# Patient Record
Sex: FEMALE | Race: WHITE | NOT HISPANIC OR LATINO | URBAN - METROPOLITAN AREA
[De-identification: names, ages, dates, MRNs, and addresses within clinical notes are randomized per-mention and may not be internally consistent; named-entity substitution may affect disease eponyms.]

---

## 2020-10-18 ENCOUNTER — ANESTHESIA EVENT (OUTPATIENT)
Dept: PERIOP | Facility: HOSPITAL | Age: 70
End: 2020-10-18

## 2020-10-18 ENCOUNTER — HOSPITAL ENCOUNTER (INPATIENT)
Facility: HOSPITAL | Age: 70
LOS: 3 days | Discharge: HOME HEALTH | End: 2020-10-21
Attending: STUDENT IN AN ORGANIZED HEALTH CARE EDUCATION/TRAINING PROGRAM | Admitting: INTERNAL MEDICINE

## 2020-10-18 ENCOUNTER — ANESTHESIA (OUTPATIENT)
Dept: PERIOP | Facility: HOSPITAL | Age: 70
End: 2020-10-18

## 2020-10-18 DIAGNOSIS — K56.609 SMALL BOWEL OBSTRUCTION (HCC): ICD-10-CM

## 2020-10-18 DIAGNOSIS — K43.3 PARASTOMAL HERNIA WITH OBSTRUCTION AND WITHOUT GANGRENE: Primary | ICD-10-CM

## 2020-10-18 PROBLEM — E11.9 TYPE 2 DIABETES MELLITUS WITHOUT COMPLICATION, WITHOUT LONG-TERM CURRENT USE OF INSULIN (HCC): Chronic | Status: ACTIVE | Noted: 2017-09-15

## 2020-10-18 PROBLEM — E11.9 DIABETES MELLITUS (HCC): Status: ACTIVE | Noted: 2017-09-15

## 2020-10-18 PROBLEM — K43.5 PARASTOMAL HERNIA: Status: ACTIVE | Noted: 2020-10-18

## 2020-10-18 PROBLEM — K21.9 GASTROESOPHAGEAL REFLUX DISEASE: Status: ACTIVE | Noted: 2018-05-01

## 2020-10-18 PROBLEM — J44.9 COPD (CHRONIC OBSTRUCTIVE PULMONARY DISEASE) (HCC): Chronic | Status: ACTIVE | Noted: 2020-10-18

## 2020-10-18 PROBLEM — K50.90 CROHN'S DISEASE (HCC): Chronic | Status: ACTIVE | Noted: 2020-10-18

## 2020-10-18 LAB
ABO GROUP BLD: NORMAL
ALBUMIN SERPL-MCNC: 4.3 G/DL (ref 3.5–5.2)
ALBUMIN/GLOB SERPL: 1.6 G/DL
ALP SERPL-CCNC: 113 U/L (ref 39–117)
ALT SERPL W P-5'-P-CCNC: 24 U/L (ref 1–33)
ANION GAP SERPL CALCULATED.3IONS-SCNC: 12 MMOL/L (ref 5–15)
APTT PPP: 24.9 SECONDS (ref 24–31)
AST SERPL-CCNC: 26 U/L (ref 1–32)
BASOPHILS # BLD AUTO: 0 10*3/MM3 (ref 0–0.2)
BASOPHILS NFR BLD AUTO: 0.4 % (ref 0–1.5)
BILIRUB SERPL-MCNC: 0.5 MG/DL (ref 0–1.2)
BLD GP AB SCN SERPL QL: NEGATIVE
BUN SERPL-MCNC: 12 MG/DL (ref 8–23)
BUN/CREAT SERPL: 13.8 (ref 7–25)
CALCIUM SPEC-SCNC: 9.5 MG/DL (ref 8.6–10.5)
CHLORIDE SERPL-SCNC: 102 MMOL/L (ref 98–107)
CO2 SERPL-SCNC: 26 MMOL/L (ref 22–29)
CREAT SERPL-MCNC: 0.87 MG/DL (ref 0.57–1)
DEPRECATED RDW RBC AUTO: 44.6 FL (ref 37–54)
EOSINOPHIL # BLD AUTO: 0 10*3/MM3 (ref 0–0.4)
EOSINOPHIL NFR BLD AUTO: 0.2 % (ref 0.3–6.2)
ERYTHROCYTE [DISTWIDTH] IN BLOOD BY AUTOMATED COUNT: 14.2 % (ref 12.3–15.4)
GFR SERPL CREATININE-BSD FRML MDRD: 65 ML/MIN/1.73
GLOBULIN UR ELPH-MCNC: 2.7 GM/DL
GLUCOSE BLDC GLUCOMTR-MCNC: 119 MG/DL (ref 70–105)
GLUCOSE BLDC GLUCOMTR-MCNC: 132 MG/DL (ref 70–105)
GLUCOSE BLDC GLUCOMTR-MCNC: 136 MG/DL (ref 70–105)
GLUCOSE BLDC GLUCOMTR-MCNC: 149 MG/DL (ref 70–105)
GLUCOSE BLDC GLUCOMTR-MCNC: 157 MG/DL (ref 70–105)
GLUCOSE SERPL-MCNC: 140 MG/DL (ref 65–99)
HCT VFR BLD AUTO: 42.7 % (ref 34–46.6)
HGB BLD-MCNC: 14.2 G/DL (ref 12–15.9)
INR PPP: 1 (ref 0.93–1.1)
LYMPHOCYTES # BLD AUTO: 1.1 10*3/MM3 (ref 0.7–3.1)
LYMPHOCYTES NFR BLD AUTO: 12.8 % (ref 19.6–45.3)
MCH RBC QN AUTO: 30.1 PG (ref 26.6–33)
MCHC RBC AUTO-ENTMCNC: 33.2 G/DL (ref 31.5–35.7)
MCV RBC AUTO: 90.6 FL (ref 79–97)
MONOCYTES # BLD AUTO: 0.8 10*3/MM3 (ref 0.1–0.9)
MONOCYTES NFR BLD AUTO: 9.1 % (ref 5–12)
NEUTROPHILS NFR BLD AUTO: 6.7 10*3/MM3 (ref 1.7–7)
NEUTROPHILS NFR BLD AUTO: 77.5 % (ref 42.7–76)
NRBC BLD AUTO-RTO: 0 /100 WBC (ref 0–0.2)
PLATELET # BLD AUTO: 220 10*3/MM3 (ref 140–450)
PMV BLD AUTO: 9.2 FL (ref 6–12)
POTASSIUM SERPL-SCNC: 4.1 MMOL/L (ref 3.5–5.2)
PROT SERPL-MCNC: 7 G/DL (ref 6–8.5)
PROTHROMBIN TIME: 11 SECONDS (ref 9.6–11.7)
RBC # BLD AUTO: 4.71 10*6/MM3 (ref 3.77–5.28)
RH BLD: POSITIVE
SARS-COV-2 RNA PNL SPEC NAA+PROBE: NOT DETECTED
SODIUM SERPL-SCNC: 140 MMOL/L (ref 136–145)
T&S EXPIRATION DATE: NORMAL
WBC # BLD AUTO: 8.7 10*3/MM3 (ref 3.4–10.8)

## 2020-10-18 PROCEDURE — 25010000002 MORPHINE PER 10 MG: Performed by: NURSE PRACTITIONER

## 2020-10-18 PROCEDURE — 85025 COMPLETE CBC W/AUTO DIFF WBC: CPT | Performed by: NURSE PRACTITIONER

## 2020-10-18 PROCEDURE — 0DN80ZZ RELEASE SMALL INTESTINE, OPEN APPROACH: ICD-10-PCS | Performed by: SURGERY

## 2020-10-18 PROCEDURE — 80053 COMPREHEN METABOLIC PANEL: CPT | Performed by: NURSE PRACTITIONER

## 2020-10-18 PROCEDURE — 25010000002 CEFAZOLIN PER 500 MG: Performed by: ANESTHESIOLOGY

## 2020-10-18 PROCEDURE — 25010000002 NEOSTIGMINE 10 MG/10ML SOLUTION: Performed by: ANESTHESIOLOGY

## 2020-10-18 PROCEDURE — 25010000002 KETOROLAC TROMETHAMINE PER 15 MG: Performed by: ANESTHESIOLOGY

## 2020-10-18 PROCEDURE — 25010000002 MORPHINE PER 10 MG: Performed by: SURGERY

## 2020-10-18 PROCEDURE — 0DB80ZZ EXCISION OF SMALL INTESTINE, OPEN APPROACH: ICD-10-PCS | Performed by: SURGERY

## 2020-10-18 PROCEDURE — 25010000002 FENTANYL CITRATE (PF) 100 MCG/2ML SOLUTION: Performed by: ANESTHESIOLOGY

## 2020-10-18 PROCEDURE — 86900 BLOOD TYPING SEROLOGIC ABO: CPT | Performed by: INTERNAL MEDICINE

## 2020-10-18 PROCEDURE — 85730 THROMBOPLASTIN TIME PARTIAL: CPT | Performed by: INTERNAL MEDICINE

## 2020-10-18 PROCEDURE — 25010000002 ONDANSETRON PER 1 MG: Performed by: NURSE PRACTITIONER

## 2020-10-18 PROCEDURE — 25010000002 HYDROMORPHONE PER 4 MG: Performed by: ANESTHESIOLOGY

## 2020-10-18 PROCEDURE — 86900 BLOOD TYPING SEROLOGIC ABO: CPT

## 2020-10-18 PROCEDURE — 0WQF0ZZ REPAIR ABDOMINAL WALL, OPEN APPROACH: ICD-10-PCS | Performed by: SURGERY

## 2020-10-18 PROCEDURE — 86850 RBC ANTIBODY SCREEN: CPT | Performed by: INTERNAL MEDICINE

## 2020-10-18 PROCEDURE — 25010000002 MORPHINE PER 10 MG: Performed by: ANESTHESIOLOGY

## 2020-10-18 PROCEDURE — 25010000002 MORPHINE PER 10 MG: Performed by: INTERNAL MEDICINE

## 2020-10-18 PROCEDURE — 82962 GLUCOSE BLOOD TEST: CPT

## 2020-10-18 PROCEDURE — 87635 SARS-COV-2 COVID-19 AMP PRB: CPT | Performed by: SURGERY

## 2020-10-18 PROCEDURE — 25010000002 ONDANSETRON PER 1 MG: Performed by: ANESTHESIOLOGY

## 2020-10-18 PROCEDURE — 25010000002 HYDRALAZINE PER 20 MG: Performed by: ANESTHESIOLOGY

## 2020-10-18 PROCEDURE — 25010000002 MIDAZOLAM PER 1 MG: Performed by: ANESTHESIOLOGY

## 2020-10-18 PROCEDURE — 44314 REVISION OF ILEOSTOMY: CPT | Performed by: SURGERY

## 2020-10-18 PROCEDURE — 86901 BLOOD TYPING SEROLOGIC RH(D): CPT

## 2020-10-18 PROCEDURE — 88304 TISSUE EXAM BY PATHOLOGIST: CPT | Performed by: SURGERY

## 2020-10-18 PROCEDURE — 99223 1ST HOSP IP/OBS HIGH 75: CPT | Performed by: SURGERY

## 2020-10-18 PROCEDURE — 25010000002 DEXAMETHASONE PER 1 MG: Performed by: ANESTHESIOLOGY

## 2020-10-18 PROCEDURE — 25010000002 PROPOFOL 200 MG/20ML EMULSION: Performed by: ANESTHESIOLOGY

## 2020-10-18 PROCEDURE — 94799 UNLISTED PULMONARY SVC/PX: CPT

## 2020-10-18 PROCEDURE — 85610 PROTHROMBIN TIME: CPT | Performed by: INTERNAL MEDICINE

## 2020-10-18 PROCEDURE — 86901 BLOOD TYPING SEROLOGIC RH(D): CPT | Performed by: INTERNAL MEDICINE

## 2020-10-18 PROCEDURE — 99222 1ST HOSP IP/OBS MODERATE 55: CPT | Performed by: INTERNAL MEDICINE

## 2020-10-18 DEVICE — PROXIMATE RELOADABLE LINEAR CUTTER WITH SAFETY LOCK-OUT, 75MM
Type: IMPLANTABLE DEVICE | Status: FUNCTIONAL
Brand: PROXIMATE

## 2020-10-18 RX ORDER — ACETAMINOPHEN 650 MG/1
650 SUPPOSITORY RECTAL EVERY 4 HOURS PRN
Status: DISCONTINUED | OUTPATIENT
Start: 2020-10-18 | End: 2020-10-21 | Stop reason: HOSPADM

## 2020-10-18 RX ORDER — ACETAMINOPHEN 650 MG/1
650 SUPPOSITORY RECTAL ONCE AS NEEDED
Status: DISCONTINUED | OUTPATIENT
Start: 2020-10-18 | End: 2020-10-18 | Stop reason: HOSPADM

## 2020-10-18 RX ORDER — ONDANSETRON 2 MG/ML
INJECTION INTRAMUSCULAR; INTRAVENOUS AS NEEDED
Status: DISCONTINUED | OUTPATIENT
Start: 2020-10-18 | End: 2020-10-18 | Stop reason: SURG

## 2020-10-18 RX ORDER — INSULIN LISPRO 100 [IU]/ML
0-9 INJECTION, SOLUTION INTRAVENOUS; SUBCUTANEOUS
Status: DISCONTINUED | OUTPATIENT
Start: 2020-10-18 | End: 2020-10-21 | Stop reason: HOSPADM

## 2020-10-18 RX ORDER — HYDROMORPHONE HCL 110MG/55ML
PATIENT CONTROLLED ANALGESIA SYRINGE INTRAVENOUS AS NEEDED
Status: DISCONTINUED | OUTPATIENT
Start: 2020-10-18 | End: 2020-10-18 | Stop reason: SURG

## 2020-10-18 RX ORDER — HYDRALAZINE HYDROCHLORIDE 20 MG/ML
INJECTION INTRAMUSCULAR; INTRAVENOUS AS NEEDED
Status: DISCONTINUED | OUTPATIENT
Start: 2020-10-18 | End: 2020-10-18 | Stop reason: SURG

## 2020-10-18 RX ORDER — ACETAMINOPHEN 160 MG/5ML
650 SOLUTION ORAL EVERY 4 HOURS PRN
Status: DISCONTINUED | OUTPATIENT
Start: 2020-10-18 | End: 2020-10-21 | Stop reason: HOSPADM

## 2020-10-18 RX ORDER — FENTANYL CITRATE 50 UG/ML
INJECTION, SOLUTION INTRAMUSCULAR; INTRAVENOUS AS NEEDED
Status: DISCONTINUED | OUTPATIENT
Start: 2020-10-18 | End: 2020-10-18 | Stop reason: SURG

## 2020-10-18 RX ORDER — DEXAMETHASONE SODIUM PHOSPHATE 4 MG/ML
INJECTION, SOLUTION INTRA-ARTICULAR; INTRALESIONAL; INTRAMUSCULAR; INTRAVENOUS; SOFT TISSUE AS NEEDED
Status: DISCONTINUED | OUTPATIENT
Start: 2020-10-18 | End: 2020-10-18 | Stop reason: SURG

## 2020-10-18 RX ORDER — ONDANSETRON 2 MG/ML
4 INJECTION INTRAMUSCULAR; INTRAVENOUS ONCE AS NEEDED
Status: DISCONTINUED | OUTPATIENT
Start: 2020-10-18 | End: 2020-10-18 | Stop reason: HOSPADM

## 2020-10-18 RX ORDER — NEOSTIGMINE METHYLSULFATE 1 MG/ML
INJECTION, SOLUTION INTRAVENOUS AS NEEDED
Status: DISCONTINUED | OUTPATIENT
Start: 2020-10-18 | End: 2020-10-18 | Stop reason: SURG

## 2020-10-18 RX ORDER — MEPERIDINE HYDROCHLORIDE 25 MG/ML
12.5 INJECTION INTRAMUSCULAR; INTRAVENOUS; SUBCUTANEOUS
Status: DISCONTINUED | OUTPATIENT
Start: 2020-10-18 | End: 2020-10-18 | Stop reason: HOSPADM

## 2020-10-18 RX ORDER — PROMETHAZINE HYDROCHLORIDE 25 MG/1
25 TABLET ORAL ONCE AS NEEDED
Status: DISCONTINUED | OUTPATIENT
Start: 2020-10-18 | End: 2020-10-18 | Stop reason: HOSPADM

## 2020-10-18 RX ORDER — ACETAMINOPHEN 325 MG/1
650 TABLET ORAL ONCE AS NEEDED
Status: DISCONTINUED | OUTPATIENT
Start: 2020-10-18 | End: 2020-10-18 | Stop reason: HOSPADM

## 2020-10-18 RX ORDER — PROPOFOL 10 MG/ML
INJECTION, EMULSION INTRAVENOUS AS NEEDED
Status: DISCONTINUED | OUTPATIENT
Start: 2020-10-18 | End: 2020-10-18 | Stop reason: SURG

## 2020-10-18 RX ORDER — NALOXONE HCL 0.4 MG/ML
0.4 VIAL (ML) INJECTION AS NEEDED
Status: DISCONTINUED | OUTPATIENT
Start: 2020-10-18 | End: 2020-10-18 | Stop reason: HOSPADM

## 2020-10-18 RX ORDER — CHOLECALCIFEROL (VITAMIN D3) 125 MCG
5 CAPSULE ORAL NIGHTLY PRN
Status: DISCONTINUED | OUTPATIENT
Start: 2020-10-18 | End: 2020-10-21 | Stop reason: HOSPADM

## 2020-10-18 RX ORDER — PROPOFOL 10 MG/ML
INJECTION, EMULSION INTRAVENOUS AS NEEDED
Status: DISCONTINUED | OUTPATIENT
Start: 2020-10-18 | End: 2020-10-18

## 2020-10-18 RX ORDER — MORPHINE SULFATE 4 MG/ML
4 INJECTION, SOLUTION INTRAMUSCULAR; INTRAVENOUS ONCE
Status: COMPLETED | OUTPATIENT
Start: 2020-10-18 | End: 2020-10-18

## 2020-10-18 RX ORDER — MORPHINE SULFATE 4 MG/ML
2 INJECTION, SOLUTION INTRAMUSCULAR; INTRAVENOUS
Status: DISCONTINUED | OUTPATIENT
Start: 2020-10-18 | End: 2020-10-18

## 2020-10-18 RX ORDER — LORAZEPAM 2 MG/ML
0.5 INJECTION INTRAMUSCULAR
Status: DISCONTINUED | OUTPATIENT
Start: 2020-10-18 | End: 2020-10-18 | Stop reason: HOSPADM

## 2020-10-18 RX ORDER — DEXTROSE MONOHYDRATE 25 G/50ML
25 INJECTION, SOLUTION INTRAVENOUS
Status: DISCONTINUED | OUTPATIENT
Start: 2020-10-18 | End: 2020-10-21 | Stop reason: HOSPADM

## 2020-10-18 RX ORDER — SODIUM CHLORIDE 0.9 % (FLUSH) 0.9 %
10 SYRINGE (ML) INJECTION EVERY 12 HOURS SCHEDULED
Status: DISCONTINUED | OUTPATIENT
Start: 2020-10-18 | End: 2020-10-19

## 2020-10-18 RX ORDER — ONDANSETRON 2 MG/ML
4 INJECTION INTRAMUSCULAR; INTRAVENOUS EVERY 6 HOURS PRN
Status: DISCONTINUED | OUTPATIENT
Start: 2020-10-18 | End: 2020-10-21 | Stop reason: HOSPADM

## 2020-10-18 RX ORDER — PROMETHAZINE HYDROCHLORIDE 25 MG/1
25 SUPPOSITORY RECTAL ONCE AS NEEDED
Status: DISCONTINUED | OUTPATIENT
Start: 2020-10-18 | End: 2020-10-18 | Stop reason: HOSPADM

## 2020-10-18 RX ORDER — KETOROLAC TROMETHAMINE 30 MG/ML
INJECTION, SOLUTION INTRAMUSCULAR; INTRAVENOUS AS NEEDED
Status: DISCONTINUED | OUTPATIENT
Start: 2020-10-18 | End: 2020-10-18 | Stop reason: SURG

## 2020-10-18 RX ORDER — ROCURONIUM BROMIDE 10 MG/ML
INJECTION, SOLUTION INTRAVENOUS AS NEEDED
Status: DISCONTINUED | OUTPATIENT
Start: 2020-10-18 | End: 2020-10-18 | Stop reason: SURG

## 2020-10-18 RX ORDER — LABETALOL HYDROCHLORIDE 5 MG/ML
INJECTION, SOLUTION INTRAVENOUS AS NEEDED
Status: DISCONTINUED | OUTPATIENT
Start: 2020-10-18 | End: 2020-10-18 | Stop reason: SURG

## 2020-10-18 RX ORDER — BISACODYL 5 MG/1
5 TABLET, DELAYED RELEASE ORAL DAILY PRN
Status: DISCONTINUED | OUTPATIENT
Start: 2020-10-18 | End: 2020-10-18

## 2020-10-18 RX ORDER — KETOROLAC TROMETHAMINE 15 MG/ML
15 INJECTION, SOLUTION INTRAMUSCULAR; INTRAVENOUS EVERY 6 HOURS PRN
Status: DISCONTINUED | OUTPATIENT
Start: 2020-10-18 | End: 2020-10-18 | Stop reason: HOSPADM

## 2020-10-18 RX ORDER — MORPHINE SULFATE 4 MG/ML
INJECTION, SOLUTION INTRAMUSCULAR; INTRAVENOUS AS NEEDED
Status: DISCONTINUED | OUTPATIENT
Start: 2020-10-18 | End: 2020-10-18 | Stop reason: SURG

## 2020-10-18 RX ORDER — SODIUM CHLORIDE, SODIUM LACTATE, POTASSIUM CHLORIDE, CALCIUM CHLORIDE 600; 310; 30; 20 MG/100ML; MG/100ML; MG/100ML; MG/100ML
100 INJECTION, SOLUTION INTRAVENOUS CONTINUOUS
Status: DISCONTINUED | OUTPATIENT
Start: 2020-10-18 | End: 2020-10-18

## 2020-10-18 RX ORDER — INSULIN LISPRO 100 [IU]/ML
0-9 INJECTION, SOLUTION INTRAVENOUS; SUBCUTANEOUS AS NEEDED
Status: DISCONTINUED | OUTPATIENT
Start: 2020-10-18 | End: 2020-10-21 | Stop reason: HOSPADM

## 2020-10-18 RX ORDER — ACETAMINOPHEN 325 MG/1
650 TABLET ORAL EVERY 4 HOURS PRN
Status: DISCONTINUED | OUTPATIENT
Start: 2020-10-18 | End: 2020-10-21 | Stop reason: HOSPADM

## 2020-10-18 RX ORDER — ONDANSETRON 4 MG/1
4 TABLET, FILM COATED ORAL EVERY 6 HOURS PRN
Status: DISCONTINUED | OUTPATIENT
Start: 2020-10-18 | End: 2020-10-21 | Stop reason: HOSPADM

## 2020-10-18 RX ORDER — HYDROCODONE BITARTRATE AND ACETAMINOPHEN 5; 325 MG/1; MG/1
1 TABLET ORAL ONCE AS NEEDED
Status: DISCONTINUED | OUTPATIENT
Start: 2020-10-18 | End: 2020-10-18 | Stop reason: HOSPADM

## 2020-10-18 RX ORDER — NICOTINE POLACRILEX 4 MG
15 LOZENGE BUCCAL
Status: DISCONTINUED | OUTPATIENT
Start: 2020-10-18 | End: 2020-10-21 | Stop reason: HOSPADM

## 2020-10-18 RX ORDER — MORPHINE SULFATE 4 MG/ML
2 INJECTION, SOLUTION INTRAMUSCULAR; INTRAVENOUS EVERY 4 HOURS PRN
Status: DISCONTINUED | OUTPATIENT
Start: 2020-10-18 | End: 2020-10-21 | Stop reason: HOSPADM

## 2020-10-18 RX ORDER — OXYCODONE HYDROCHLORIDE 5 MG/1
5 TABLET ORAL EVERY 4 HOURS PRN
Status: DISCONTINUED | OUTPATIENT
Start: 2020-10-18 | End: 2020-10-21 | Stop reason: HOSPADM

## 2020-10-18 RX ORDER — FLUMAZENIL 0.1 MG/ML
0.5 INJECTION INTRAVENOUS AS NEEDED
Status: DISCONTINUED | OUTPATIENT
Start: 2020-10-18 | End: 2020-10-18 | Stop reason: HOSPADM

## 2020-10-18 RX ORDER — MIDAZOLAM HYDROCHLORIDE 1 MG/ML
INJECTION INTRAMUSCULAR; INTRAVENOUS AS NEEDED
Status: DISCONTINUED | OUTPATIENT
Start: 2020-10-18 | End: 2020-10-18 | Stop reason: SURG

## 2020-10-18 RX ORDER — SODIUM CHLORIDE 0.9 % (FLUSH) 0.9 %
10 SYRINGE (ML) INJECTION AS NEEDED
Status: DISCONTINUED | OUTPATIENT
Start: 2020-10-18 | End: 2020-10-19

## 2020-10-18 RX ADMIN — MORPHINE SULFATE 4 MG: 4 INJECTION INTRAVENOUS at 06:21

## 2020-10-18 RX ADMIN — CEFAZOLIN SODIUM 2 G: 10 INJECTION, POWDER, FOR SOLUTION INTRAVENOUS at 13:32

## 2020-10-18 RX ADMIN — MIDAZOLAM 2 MG: 1 INJECTION INTRAMUSCULAR; INTRAVENOUS at 13:26

## 2020-10-18 RX ADMIN — SODIUM CHLORIDE, POTASSIUM CHLORIDE, SODIUM LACTATE AND CALCIUM CHLORIDE: 600; 310; 30; 20 INJECTION, SOLUTION INTRAVENOUS at 14:35

## 2020-10-18 RX ADMIN — LABETALOL 20 MG/4 ML (5 MG/ML) INTRAVENOUS SYRINGE 10 MG: at 14:20

## 2020-10-18 RX ADMIN — KETOROLAC TROMETHAMINE 30 MG: 30 INJECTION, SOLUTION INTRAMUSCULAR at 15:25

## 2020-10-18 RX ADMIN — SODIUM CHLORIDE, POTASSIUM CHLORIDE, SODIUM LACTATE AND CALCIUM CHLORIDE 100 ML/HR: 600; 310; 30; 20 INJECTION, SOLUTION INTRAVENOUS at 06:52

## 2020-10-18 RX ADMIN — NEOSTIGMINE METHYLSULFATE 4 MG: 1 INJECTION, SOLUTION INTRAVENOUS at 15:34

## 2020-10-18 RX ADMIN — PROPOFOL 180 MG: 10 INJECTION, EMULSION INTRAVENOUS at 13:32

## 2020-10-18 RX ADMIN — MORPHINE SULFATE 4 MG: 4 INJECTION INTRAVENOUS at 11:16

## 2020-10-18 RX ADMIN — SODIUM CHLORIDE, POTASSIUM CHLORIDE, SODIUM LACTATE AND CALCIUM CHLORIDE: 600; 310; 30; 20 INJECTION, SOLUTION INTRAVENOUS at 13:50

## 2020-10-18 RX ADMIN — HYDROMORPHONE HYDROCHLORIDE 0.4 MG: 2 INJECTION INTRAMUSCULAR; INTRAVENOUS; SUBCUTANEOUS at 15:12

## 2020-10-18 RX ADMIN — HYDROMORPHONE HYDROCHLORIDE 0.5 MG: 2 INJECTION INTRAMUSCULAR; INTRAVENOUS; SUBCUTANEOUS at 15:20

## 2020-10-18 RX ADMIN — MORPHINE SULFATE 4 MG: 4 INJECTION INTRAVENOUS at 13:52

## 2020-10-18 RX ADMIN — ROCURONIUM BROMIDE 10 MG: 10 INJECTION, SOLUTION INTRAVENOUS at 13:53

## 2020-10-18 RX ADMIN — ONDANSETRON 4 MG: 2 INJECTION INTRAMUSCULAR; INTRAVENOUS at 15:27

## 2020-10-18 RX ADMIN — HYDRALAZINE HYDROCHLORIDE 6 MG: 20 INJECTION INTRAMUSCULAR; INTRAVENOUS at 15:31

## 2020-10-18 RX ADMIN — ONDANSETRON 4 MG: 2 INJECTION INTRAMUSCULAR; INTRAVENOUS at 06:21

## 2020-10-18 RX ADMIN — FENTANYL CITRATE 100 MCG: 50 INJECTION, SOLUTION INTRAMUSCULAR; INTRAVENOUS at 13:26

## 2020-10-18 RX ADMIN — MORPHINE SULFATE 2 MG: 4 INJECTION INTRAVENOUS at 21:23

## 2020-10-18 RX ADMIN — Medication 10 ML: at 10:10

## 2020-10-18 RX ADMIN — GLYCOPYRROLATE 0.6 MG: 0.2 INJECTION, SOLUTION INTRAMUSCULAR; INTRAVITREAL at 15:34

## 2020-10-18 RX ADMIN — HYDROMORPHONE HYDROCHLORIDE 0.5 MG: 2 INJECTION INTRAMUSCULAR; INTRAVENOUS; SUBCUTANEOUS at 15:36

## 2020-10-18 RX ADMIN — HYDROMORPHONE HYDROCHLORIDE 0.6 MG: 2 INJECTION INTRAMUSCULAR; INTRAVENOUS; SUBCUTANEOUS at 15:27

## 2020-10-18 RX ADMIN — LABETALOL 20 MG/4 ML (5 MG/ML) INTRAVENOUS SYRINGE 10 MG: at 14:36

## 2020-10-18 RX ADMIN — DEXAMETHASONE SODIUM PHOSPHATE 4 MG: 4 INJECTION, SOLUTION INTRAMUSCULAR; INTRAVENOUS at 15:27

## 2020-10-18 RX ADMIN — ROCURONIUM BROMIDE 50 MG: 10 INJECTION, SOLUTION INTRAVENOUS at 13:32

## 2020-10-18 NOTE — PLAN OF CARE
Goal Outcome Evaluation:    Patient admitted for partial small bowel obstruction. She has complained of pain and nausea, treated per MAR. General surgery consult called, Dr. Genao to see her. Patient has had ileostomy since 2012, the area around the ileostomy is protruding significantly, patient states this started yesterday.

## 2020-10-18 NOTE — CONSULTS
Inpatient General Surgery Consult  Consult performed by: Kenyon Genao MD  Consult ordered by: Anthony Levine APRN  Reason for consult: parastomal hernia          General Surgery Consult Note      Subjective     70-year-old lady with a history of Crohn's disease who underwent colon resection in 2012 with end ileostomy and developed a parastomal hernia which was revised about 2 years ago who presented to an outside emergency department with a chief complaint of worsening abdominal pain.  She says the pain is around her stoma.  It is sharp and crampy and intermittent.  It is associated with nausea and vomiting.  Her ileostomy output basically stopped after yesterday evening.  She says that she has had a small parastomal hernia that got quite a bit larger yesterday has been more firm than normal.  At the outside hospital CT scan demonstrated a parastomal hernia with evidence of bowel obstruction.  She was then transferred here and I was asked to see her this morning.    History  Past Medical History:   Diagnosis Date   • Crohn's disease (CMS/HCC)    • Diabetes mellitus (CMS/HCC)    • GERD (gastroesophageal reflux disease)    • Hyperlipidemia    • Hypertension      Past Surgical History:   Procedure Laterality Date   • CHOLECYSTECTOMY     • COLON RESECTION WITH ILEOSTOMY  2012   • HERNIA REPAIR     • TONSILLECTOMY     • TUBAL ABDOMINAL LIGATION       Family History   Problem Relation Age of Onset   • Cancer Mother    • Cancer Father    • Diabetes Father    • Cancer Brother      Social History     Tobacco Use   • Smoking status: Former Smoker   • Smokeless tobacco: Never Used   Substance Use Topics   • Alcohol use: Not Currently   • Drug use: Never     No medications prior to admission.     Allergies:  Vancomycin and Sulfa antibiotics    Review of Systems   Constitutional: Positive for chills. Negative for fever.   HENT: Negative for sore throat and trouble swallowing.    Eyes: Negative for blurred vision and  double vision.   Respiratory: Negative for cough and shortness of breath.    Cardiovascular: Negative for chest pain and leg swelling.   Gastrointestinal: Positive for abdominal pain, constipation, nausea and vomiting. Negative for abdominal distention, blood in stool and diarrhea.   Genitourinary: Negative for dysuria and hematuria.   Skin: Negative for rash and bruise.   Neurological: Negative for dizziness and confusion.   Psychiatric/Behavioral: Negative for agitation and depressed mood.        Objective     Vital Signs  Temp:  [98 °F (36.7 °C)-98.8 °F (37.1 °C)] 98.3 °F (36.8 °C)  Heart Rate:  [69-96] 96  Resp:  [15-22] 15  BP: (163-167)/(78-90) 167/90    Physical Exam  Constitutional:       Appearance: She is well-developed.      Comments: Uncomfortable   HENT:      Head: Normocephalic and atraumatic.   Eyes:      General: No scleral icterus.     Conjunctiva/sclera: Conjunctivae normal.   Neck:      Musculoskeletal: No muscular tenderness.      Trachea: No tracheal deviation.   Cardiovascular:      Rate and Rhythm: Normal rate.      Pulses: Normal pulses.   Pulmonary:      Effort: Pulmonary effort is normal. No respiratory distress.   Abdominal:      Comments: Healed midline incision.  Right lower quadrant ileostomy with large parastomal hernia that is firm it is not reducible.  The ileostomy is viable.   Musculoskeletal:         General: No swelling or tenderness.   Lymphadenopathy:      Cervical: No cervical adenopathy.   Skin:     General: Skin is warm and dry.   Neurological:      General: No focal deficit present.      Mental Status: She is alert. Mental status is at baseline.   Psychiatric:         Mood and Affect: Mood normal.         Behavior: Behavior normal.         Results Review:  Lab Results (last 24 hours)     Procedure Component Value Units Date/Time    POC Glucose Once [[id number]]  (Abnormal) Collected: 10/18/20 1121    Specimen: Blood Updated: 10/18/20 1125     Glucose 119 mg/dL      Comment:  Serial Number: [id number]:  [id number]       POC Glucose Once [[id number]]  (Abnormal) Collected: 10/18/20 0747    Specimen: Blood Updated: 10/18/20 0748     Glucose 132 mg/dL      Comment: Serial Number: [id number]:  [id number]       Comprehensive Metabolic Panel [[id number]]  (Abnormal) Collected: 10/18/20 0711    Specimen: Blood Updated: 10/18/20 0743     Glucose 140 mg/dL      BUN 12 mg/dL      Creatinine 0.87 mg/dL      Sodium 140 mmol/L      Potassium 4.1 mmol/L      Chloride 102 mmol/L      CO2 26.0 mmol/L      Calcium 9.5 mg/dL      Total Protein 7.0 g/dL      Albumin 4.30 g/dL      ALT (SGPT) 24 U/L      AST (SGOT) 26 U/L      Alkaline Phosphatase 113 U/L      Total Bilirubin 0.5 mg/dL      eGFR Non African Amer 65 mL/min/1.73      Globulin 2.7 gm/dL      A/G Ratio 1.6 g/dL      BUN/Creatinine Ratio 13.8     Anion Gap 12.0 mmol/L     Narrative:      GFR Normal >60  Chronic Kidney Disease <60  Kidney Failure <15      aPTT [[id number]]  (Normal) Collected: 10/18/20 0711    Specimen: Blood Updated: 10/18/20 0734     PTT 24.9 seconds     Protime-INR [[id number]]  (Normal) Collected: 10/18/20 0711    Specimen: Blood Updated: 10/18/20 0734     Protime 11.0 Seconds      INR 1.00    CBC Auto Differential [[id number]]  (Abnormal) Collected: 10/18/20 0711    Specimen: Blood Updated: 10/18/20 0730     WBC 8.70 10*3/mm3      RBC 4.71 10*6/mm3      Hemoglobin 14.2 g/dL      Hematocrit 42.7 %      MCV 90.6 fL      MCH 30.1 pg      MCHC 33.2 g/dL      RDW 14.2 %      RDW-SD 44.6 fl      MPV 9.2 fL      Platelets 220 10*3/mm3      Neutrophil % 77.5 %      Lymphocyte % 12.8 %      Monocyte % 9.1 %      Eosinophil % 0.2 %      Basophil % 0.4 %      Neutrophils, Absolute 6.70 10*3/mm3      Lymphocytes, Absolute 1.10 10*3/mm3      Monocytes, Absolute 0.80 10*3/mm3      Eosinophils, Absolute 0.00 10*3/mm3      Basophils, Absolute 0.00 10*3/mm3      nRBC 0.0 /100 WBC         Imaging Results (Last 24 Hours)     ** No  results found for the last 24 hours. **          I reviewed the patient's other test results and agree with the interpretation  I reviewed the patient's new imaging results and agree with the interpretation.    Assessment/Plan     Principal Problem:    Small bowel obstruction (CMS/HCC)  Active Problems:    Diabetes mellitus (CMS/HCC)    Gastroesophageal reflux disease    Parastomal hernia    COPD (chronic obstructive pulmonary disease) (CMS/HCC)    Crohn's disease (CMS/HCC)      Parastomal hernia with incarceration and bowel obstruction.  I counseled her about the treatment options which are going to include exploratory laparotomy with reduction of the parastomal hernia and either revision of the parastomal hernia versus moving the ileostomy.  The general risks of surgery have been discussed including midline issues like recurrent hernia bleeding infection incidental injury to the small bowel bowel resection and recurrent parastomal hernia in the future.  After our discussion she does understand the risks of the procedure and is willing to proceed.    We will go ahead and place nasogastric tube for nausea and vomiting.  Consent for exploratory laparotomy with parastomal hernia repair.  We will be moving towards the operating room shortly    This note was created using Dragon Voice Recognition software.    Kenyon Genao MD  10/18/20  12:29 EDT

## 2020-10-18 NOTE — ANESTHESIA PREPROCEDURE EVALUATION
Anesthesia Evaluation     Patient summary reviewed and Nursing notes reviewed   NPO Solid Status: > 6 hours  NPO Liquid Status: > 6 hours           Airway   Mallampati: II  TM distance: >3 FB  Neck ROM: full  No difficulty expected  Dental - normal exam     Pulmonary - normal exam    breath sounds clear to auscultation  (+) COPD,   Cardiovascular - normal exam    ECG reviewed  Rhythm: regular  Rate: normal    (+) hypertension, hyperlipidemia,       Neuro/Psych- negative ROS  GI/Hepatic/Renal/Endo    (+)  GERD,  diabetes mellitus,     Musculoskeletal (-) negative ROS    Abdominal  - normal exam    Abdomen: soft.  Bowel sounds: normal.   Substance History - negative use     OB/GYN negative ob/gyn ROS         Other - negative ROS                       Anesthesia Plan    ASA 3 - emergent     general     intravenous induction     Anesthetic plan, all risks, benefits, and alternatives have been provided, discussed and informed consent has been obtained with: patient.  Use of blood products discussed with patient .

## 2020-10-18 NOTE — H&P
[facility] Medicine Services      Patient Name: Leonor Macias  : 1950  MRN: [id number]  Primary Care Physician: Emmanuel Guzman MD  Date of admission: 10/18/2020    Patient Care Team:  Emmanuel Guzman MD as PCP - General (Family Medicine)          Subjective   History Present Illness     Chief Complaint: Small bowel obstruction      Ms. Macias is a 70 y.o. female with a history of ulcerative colitis/Crohn's disease, diabetes type 2, GERD, hyperlipidemia, hypertension, extensive abdominal surgeries presented to an outlying facility ([facility] in [facility]) for a sudden onset of acute abdominal pain, nausea, and vomiting.  Patient has an ileostomy and states that she has not had output since about 8 PM on 10/17/2020.  Patient states her pain is a 7 out of 10, intermittent, sharp, cramping, relieved with pain medication to a 3 out of 10.  Patient states she has had a ileostomy since  where she had a bowel resection with a an ileostomy placed.  Patient states she is also had multiple hernia repairs.  Vital signs: 141/61, temp 97.6, heart rate 80, respiratory rate 16, O2 saturation 96%  Patient denies fever, chills, cough, congestion, chest pain, or ill contacts.  Patient was transferred to [facility] for further management and evaluation.    Labs: Lactic acid 1.3, sodium 139, potassium 3.6, BUN 18, creatinine 1.21, glucose 114, ALT 26, AST 38, alkaline phosphatase 127, WBC 8.0, Hgb 15.1, HCT 46.0, platelets 242.    CT scan abdomen pelvis with IV contrast: Mild distal partial small bowel obstruction with transition site seen with enlarged parastomal hernia along the right ileostomy.,  Mild ascites, small cystocele.    Obtain CBC, CMP, consult general surgery, cardiac monitor, n.p.o.,    Nursing staff to verify home medications.       Review of Systems   Constitution: Negative.   HENT: Negative.    Eyes: Negative.    Cardiovascular: Negative.    Respiratory:  Negative.    Endocrine: Negative.    Hematologic/Lymphatic: Negative.    Skin: Negative.    Musculoskeletal: Negative.    Gastrointestinal: Positive for abdominal pain, nausea and vomiting.   Genitourinary: Negative.    Neurological: Negative.    Psychiatric/Behavioral: Negative.    Allergic/Immunologic: Negative.    All other systems reviewed and are negative.          Personal History     Past Medical History:   Past Medical History:   Diagnosis Date   • Crohn's disease (CMS/HCC)    • Diabetes mellitus (CMS/HCC)    • GERD (gastroesophageal reflux disease)    • Hyperlipidemia    • Hypertension        Surgical History:      Past Surgical History:   Procedure Laterality Date   • CHOLECYSTECTOMY     • COLON RESECTION WITH ILEOSTOMY  2012   • HERNIA REPAIR     • TONSILLECTOMY     • TUBAL ABDOMINAL LIGATION             Family History: family history includes Cancer in her brother, father, and mother; Diabetes in her father. Otherwise pertinent FHx was reviewed and unremarkable.     Social History:  reports that she has quit smoking. She has never used smokeless tobacco. She reports previous alcohol use. She reports that she does not use drugs.      Medications:  Prior to Admission medications    Not on File       Allergies:    Allergies   Allergen Reactions   • Vancomycin Unknown - High Severity   • Sulfa Antibiotics Unknown - Low Severity       Objective   Objective     Vital Signs  Temp:  [98 °F (36.7 °C)] 98 °F (36.7 °C)  Heart Rate:  [69] 69  Resp:  [16] 16  BP: (163)/(79) 163/79  SpO2:  [94 %] 94 %  on   ;   Device (Oxygen Therapy): room air  Body mass index is 24.01 kg/m².    Physical Exam  Vitals signs reviewed.   Constitutional:       Appearance: She is ill-appearing.   HENT:      Head: Normocephalic.      Nose: Nose normal.      Mouth/Throat:      Mouth: Mucous membranes are dry.   Eyes:      Conjunctiva/sclera: Conjunctivae normal.   Neck:      Musculoskeletal: Normal range of motion.   Cardiovascular:       Rate and Rhythm: Normal rate and regular rhythm.   Pulmonary:      Effort: Pulmonary effort is normal.      Breath sounds: Normal breath sounds.   Abdominal:      General: There is distension.      Tenderness: There is abdominal tenderness. There is guarding.   Musculoskeletal: Normal range of motion.   Skin:     General: Skin is warm and dry.      Capillary Refill: Capillary refill takes less than 2 seconds.      Coloration: Skin is pale.   Neurological:      Mental Status: She is alert and oriented to person, place, and time.         Results Review:  I have personally reviewed most recent lab results, microbiology results and radiology images and interpretations and agree with findings,           Invalid input(s):  ALKPHOS  CrCl cannot be calculated (No successful lab value found.).  Brief Urine Lab Results     None          Microbiology Results (last 10 days)     ** No results found for the last 240 hours. **          ECG/EMG Results (most recent)     None                    No radiology results for the last 7 days      CrCl cannot be calculated (No successful lab value found.).    Assessment/Plan   Assessment/Plan       Active Hospital Problems    Diagnosis  POA   • **Small bowel obstruction (CMS/[facility]) [K56.609]  Yes   • Parastomal hernia [K43.5]  Yes   • COPD (chronic obstructive pulmonary disease) (CMS/[facility]) [J44.9]  Yes   • Crohn's disease (CMS/[facility]) [K50.90]  Yes   • Gastroesophageal reflux disease [K21.9]  Yes   • Diabetes mellitus (CMS/[facility]) [E11.9]  Yes      Resolved Hospital Problems   No resolved problems to display.     Bowel obstruction  -Keep n.p.o.  - IV fluids for hydration  -Antiemetics PRN  - Pain medication PRN  -General surgery consult    Hypertension  - Monitor BP  -Continue home medications once verified    COPD  -Not in acute exacerbation  - Monitor O2 saturations    GERD, chronic  - Continue PPI    Diabetes type 2  -Accu-Cheks before meals and at bedtime  -Sliding scale insulin as  needed      *Patient's home medications have not been verified at time of H&P, nursing staff to verify home medications          VTE Prophylaxis -   Mechanical Order History:      Ordered        10/18/20 0601  Place Sequential Compression Device  Once         10/18/20 0601  Maintain Sequential Compression Device  Continuous                 Pharmalogical Order History:     None          CODE STATUS:    Code Status and Medical Interventions:   Ordered at: 10/18/20 0601     Code Status:    CPR     Medical Interventions (Level of Support Prior to Arrest):    Full       This patient has been examined wearing appropriate Personal Protective Equipment . 10/18/20      I discussed the patient's findings and my recommendations with patient.        Electronically signed by RA Mays, 10/18/20, 6:09 AM EDT.  [facility] Hospitalist Team          Agree with above mentioned except my evaluation, assessment, plan and treatment supercedes that of [facility] or PA.  Ms. Macias is a 70 y.o. female with a history of ulcerative colitis/Crohn's disease, diabetes type 2, GERD, hyperlipidemia, hypertension, extensive abdominal surgeries presented to an outlying facility ([facility]) for a sudden onset of acute abdominal pain, nausea, and vomiting.  Patient has an ileostomy and states that she has not had output since about 8 PM on 10/17/2020.  Patient states her pain is a 7 out of 10, intermittent, sharp, cramping, relieved with pain medication to a 3 out of 10.  Patient states she has had a ileostomy since 2012 where she had a bowel resection with a an ileostomy placed.  Patient states she is also had multiple hernia repairs.  Vital signs: 141/61, temp 97.6, heart rate 80, respiratory rate 16, O2 saturation 96%  Patient denies fever, chills, cough, congestion, chest pain, or ill contacts.  Patient was transferred to [facility] for further management and evaluation.     Labs: Lactic acid 1.3,  sodium 139, potassium 3.6, BUN 18, creatinine 1.21, glucose 114, ALT 26, AST 38, alkaline phosphatase 127, WBC 8.0, Hgb 15.1, HCT 46.0, platelets 242.     CT scan abdomen pelvis with IV contrast: Mild distal partial small bowel obstruction with transition site seen with enlarged parastomal hernia along the right ileostomy.,  Mild ascites, small cystocele.     Obtain CBC, CMP, consult general surgery, cardiac monitor, n.p.o.,      History of subtotal colectomy with ileostomy placement 10 years ago for Crohn's disease.    ROS:  Constitution: Negative.   HENT: Negative.    Eyes: Negative.    Cardiovascular: Negative.    Respiratory: Negative.    Endocrine: Negative.    Hematologic/Lymphatic: Negative.    Skin: Negative.    Musculoskeletal: Negative.    Gastrointestinal: Positive for abdominal pain, nausea and vomiting.   Genitourinary: Negative.    Neurological: Negative.    Psychiatric/Behavioral: Negative.    Allergic/Immunologic: Negative.    All other systems reviewed and are negative.    Noted      Physical Exam   Constitutional: Patient appears well-developed and well-nourished and in no acute distress patient is status postop now.  Very pleasant female.    HEENT:   Head: Normocephalic and atraumatic.   Eyes:  Pupils are equal, round, and reactive to light. EOM are intact. Sclera are anicteric and non-injected.  Mouth and Throat: Patient has moist mucous membranes. Oropharynx is clear of any erythema or exudate.       Neck: Neck supple.  No thyromegaly present. No lymphadenopathy present. No  masses.     Cardiovascular: Inspection: No JVD present. Palpation: No parasternal heave. Pedal pulses +1 left and absent right.  No leg edema. Auscultation: Regular rate, regular rhythm, S1 normal and S2 normal. reveals no gallop and no friction rub. No Carotid bruit bilaterally.    Pulmonary/Chest: Inspection: No distress, no use of accessory muscles. Lungs are clear to auscultation bilaterally. No respiratory distress. No  wheezes. No rales.     Abdomen /Gastrointestinal: Inspection: no distension. Palpation: no masses, no organomegaly. Soft. There is no tenderness. Bowel sounds are absent.  NG tube present.  Ileostomy left lower quadrant present.    Musculoskeletal: Normal Muscle tone. Age appropriate, no deformities.    Neurological: Patient is alert and oriented to person, place, and time. Cranial nerves II-XII are grossly intact with no focal deficits. Sensori-motor exam is normal. No cerebellar signs.    Skin: Skin is warm. No rash noted. Nails show no clubbing.  No cyanosis or erythema. No bruising.    Emotional Behavior:   Appropriate   Debilities:  Age appropriate      Active Hospital Problems    Diagnosis  POA   • **Small bowel obstruction (CMS/HCC) [K56.609]  Yes     Priority: High   • Parastomal hernia with obstruction and without gangrene [K43.3]  Yes     Priority: Medium   • COPD (chronic obstructive pulmonary disease) (CMS/HCC) [J44.9]  Yes   • Crohn's disease (CMS/HCC) [K50.90]  Yes   • Type 2 diabetes mellitus without complication, without long-term current use of insulin (CMS/[facility]) [E11.9]  Yes      Resolved Hospital Problems   No resolved problems to display.     SBO:  Resolved post surgery.    Postop ileus now.      Parastomal hernia:  Resolved.      COPD:  Stable      Crohn's disease:  Stable with previous complications.      Diabetes mellitus 2:  Not on any medication at home.  Continue to monitor and sliding scale

## 2020-10-18 NOTE — ANESTHESIA POSTPROCEDURE EVALUATION
Patient: Leonor Macias    Procedure Summary     Date: 10/18/20 Room / Location: [facility] OR 08 / [facility] MAIN OR    Anesthesia Start: 1326 Anesthesia Stop: 1600    Procedure: LAPAROTOMY EXPLORATORY, VENTRAL HERNIA REPAIR, SMALL BOWEL RESECTION AND END ILEOSTOMY (N/A Abdomen) Diagnosis:       Small bowel obstruction (CMS/HCC)      Parastomal hernia with obstruction and without gangrene      (Small bowel obstruction (CMS/HCC) [K56.609])      (Parastomal hernia with obstruction and without gangrene [K43.3])    Surgeon: Kenyon Genao MD Provider: Michael Dickson MD    Anesthesia Type: general ASA Status: 3 - Emergent          Anesthesia Type: general    Vitals  Vitals Value Taken Time   /56 10/18/20 1603   Temp     Pulse 88 10/18/20 1603   Resp     SpO2 99 % 10/18/20 1603   Vitals shown include unvalidated device data.        Post Anesthesia Care and Evaluation    Patient location during evaluation: bedside  Patient participation: complete - patient participated  Level of consciousness: awake and alert  Pain score: 1  Pain management: adequate  Airway patency: patent  Anesthetic complications: No anesthetic complications  PONV Status: none  Cardiovascular status: acceptable  Respiratory status: acceptable  Hydration status: acceptable  Post Neuraxial Block status: Motor and sensory function returned to baseline

## 2020-10-18 NOTE — OP NOTE
Operative Report:    Patient Name:  Leonor Macias  YOB: 1950    Date of Surgery:  10/18/2020     Indications: 70-year-old lady who was transferred from outside hospital the chief complaint worsening parastomal pain nausea and vomiting.  On arrival she was found to have an incarcerated parastomal hernia.  I counseled her about the risks and benefits of exploratory laparotomy reduction of parastomal hernia and since she had already had previous parastomal hernia repair plan for moving the stoma.  After discussion about the risk and benefits of surgery she understood and was willing to proceed.    Pre-op Diagnosis:   Small bowel obstruction (CMS/HCC) [K56.609]  Parastomal hernia with obstruction and without gangrene [K43.3]       Post-Op Diagnosis Codes:     * Small bowel obstruction (CMS/HCC) [K56.609]     * Parastomal hernia with obstruction and without gangrene [K43.3]    Procedure/CPT® Codes:      Procedure(s):  Exploratory laparotomy with lysis of adhesions  Reduction of incarcerated parastomal hernia  Small bowel resection  End ileostomy  Ventral hernia repair    Staff:  Surgeon(s):  Kenyon Genao MD          Anesthesia: General    Estimated Blood Loss: 100ml    Implants:    Implant Name Type Inv. Item Serial No.  Lot No. LRB No. Used Action   STPLR LNR CUT PROX 75MM BENNIE TLC75 - [id number] Implant STPLR LNR CUT PROX 75MM BENNIE TLC75  [employer]  DIV [employer] J [employer] J [id number] N/A 1 Implanted       Specimen:          Specimens     ID Source Type Tests Collected By Collected At Frozen?      A Small Intestine Tissue · TISSUE PATHOLOGY EXAM   Kenyon Genao MD 10/18/20 [id number]      Description: SMALL BOWEL    This specimen was not marked as sent.              Findings: Large parastomal hernia incarcerated small bowel was viable    Complications: None    Description of Procedure: Patient was taken to the operating room placed on the operating table in the supine position under general  anesthesia.  Her abdomen was prepped and draped normal sterile fashion.  Timeout was performed identifying correct patient procedure site of operation.  I began the operation by making a midline laparotomy incision.  The fascia was incised the peritoneum was entered sharply there is no evidence of injury to underlying structures.  I then had to extend the midline incision inferiorly and lyse adhesions.  I then turned my attention to the parastomal hernia.  I performed a lysis of adhesions and was able to dissect the small bowel away from the previously placed parastomal hernia mesh.  By making a fascial incision I was able to reduce a large amount of proximal small bowel that was herniated through the parastomal defect.  It was dusky but over the course of 5 to 10 minutes pinked back up to normal.  I then excised the stoma exit site with the Bovie down to the level of the fascia.  During the process of detaching the small bowel from the previously placed mesh several partial-thickness injuries were unavoidable and made to the small bowel.  Therefore I selected a site proximal to this made a window in the mesentery and divided the bowel with a CON 75 staple load.  The mesentery was then divided using clamps and silk ties.  The specimen was then sent to pathology as small bowel.  I then performed a closure of the previous right lower quadrant hernia site by freeing the anterior fascia and then closing the posterior fascia including the previously placed what appeared to be Stratus mesh using interrupted figure-of-eight 0 Prolene sutures.  Through the skin defect I then closed the anterior fascia using interrupted 0 Prolene sutures.  I then selected a site on the left upper quadrant and excised bit of skin.  The anterior posterior fascia were incised and muscle was split and the small bowel was brought through this defect.  The abdomen was inspected there is no evidence of bleeding.  The abdomen and skin were irrigated  using Aricept and then saline.  We then changed gowns and gloves.  I closed the abdominal fascia using #1 looped PDS.  The skin was closed with staples.  The stoma site was closed with staples.  I then matured the small bowel ileostomy as an Endoloop using 3-0 Vicryl sutures.  At the end of the case the counts were correct the patient was transferred to recovery in good condition.      Kenyon Genao MD     Date: 10/18/2020  Time: 15:50 EDT    This note was created using Dragon Voice Recognition software.

## 2020-10-18 NOTE — PLAN OF CARE
Goal Outcome Evaluation:  Plan of Care Reviewed With: patient      Patient admitted today with small bowel obstruction. Taken to OR with Dr. Genao for exploratory lap and peristomal hernia repair. Patient arrived back on the unit around 1700. NG to intermittent low wall suction. Will continue to monitor.

## 2020-10-19 LAB
ALBUMIN SERPL-MCNC: 3.4 G/DL (ref 3.5–5.2)
ALBUMIN/GLOB SERPL: 1.4 G/DL
ALP SERPL-CCNC: 89 U/L (ref 39–117)
ALT SERPL W P-5'-P-CCNC: 30 U/L (ref 1–33)
ANION GAP SERPL CALCULATED.3IONS-SCNC: 11 MMOL/L (ref 5–15)
AST SERPL-CCNC: 33 U/L (ref 1–32)
BACTERIA UR QL AUTO: ABNORMAL /HPF
BASOPHILS # BLD AUTO: 0 10*3/MM3 (ref 0–0.2)
BASOPHILS NFR BLD AUTO: 0.4 % (ref 0–1.5)
BILIRUB SERPL-MCNC: 0.7 MG/DL (ref 0–1.2)
BILIRUB UR QL STRIP: NEGATIVE
BUN SERPL-MCNC: 11 MG/DL (ref 8–23)
BUN/CREAT SERPL: 15.1 (ref 7–25)
CALCIUM SPEC-SCNC: 9.2 MG/DL (ref 8.6–10.5)
CHLORIDE SERPL-SCNC: 108 MMOL/L (ref 98–107)
CLARITY UR: CLEAR
CO2 SERPL-SCNC: 24 MMOL/L (ref 22–29)
COLOR UR: YELLOW
CREAT SERPL-MCNC: 0.73 MG/DL (ref 0.57–1)
DEPRECATED RDW RBC AUTO: 45.1 FL (ref 37–54)
EOSINOPHIL # BLD AUTO: 0.1 10*3/MM3 (ref 0–0.4)
EOSINOPHIL NFR BLD AUTO: 0.9 % (ref 0.3–6.2)
ERYTHROCYTE [DISTWIDTH] IN BLOOD BY AUTOMATED COUNT: 14.3 % (ref 12.3–15.4)
GFR SERPL CREATININE-BSD FRML MDRD: 79 ML/MIN/1.73
GLOBULIN UR ELPH-MCNC: 2.5 GM/DL
GLUCOSE BLDC GLUCOMTR-MCNC: 106 MG/DL (ref 70–105)
GLUCOSE BLDC GLUCOMTR-MCNC: 115 MG/DL (ref 70–105)
GLUCOSE BLDC GLUCOMTR-MCNC: 124 MG/DL (ref 70–105)
GLUCOSE BLDC GLUCOMTR-MCNC: 129 MG/DL (ref 70–105)
GLUCOSE SERPL-MCNC: 147 MG/DL (ref 65–99)
GLUCOSE UR STRIP-MCNC: NEGATIVE MG/DL
HCT VFR BLD AUTO: 41.1 % (ref 34–46.6)
HGB BLD-MCNC: 13.4 G/DL (ref 12–15.9)
HGB UR QL STRIP.AUTO: ABNORMAL
HYALINE CASTS UR QL AUTO: ABNORMAL /LPF
KETONES UR QL STRIP: ABNORMAL
LEUKOCYTE ESTERASE UR QL STRIP.AUTO: NEGATIVE
LYMPHOCYTES # BLD AUTO: 1.1 10*3/MM3 (ref 0.7–3.1)
LYMPHOCYTES NFR BLD AUTO: 9.4 % (ref 19.6–45.3)
MCH RBC QN AUTO: 29.8 PG (ref 26.6–33)
MCHC RBC AUTO-ENTMCNC: 32.7 G/DL (ref 31.5–35.7)
MCV RBC AUTO: 90.9 FL (ref 79–97)
MONOCYTES # BLD AUTO: 1.4 10*3/MM3 (ref 0.1–0.9)
MONOCYTES NFR BLD AUTO: 12.2 % (ref 5–12)
NEUTROPHILS NFR BLD AUTO: 77.1 % (ref 42.7–76)
NEUTROPHILS NFR BLD AUTO: 8.8 10*3/MM3 (ref 1.7–7)
NITRITE UR QL STRIP: NEGATIVE
NRBC BLD AUTO-RTO: 0 /100 WBC (ref 0–0.2)
PH UR STRIP.AUTO: 6 [PH] (ref 5–8)
PLATELET # BLD AUTO: 211 10*3/MM3 (ref 140–450)
PMV BLD AUTO: 9 FL (ref 6–12)
POTASSIUM SERPL-SCNC: 4.1 MMOL/L (ref 3.5–5.2)
PROT SERPL-MCNC: 5.9 G/DL (ref 6–8.5)
PROT UR QL STRIP: ABNORMAL
RBC # BLD AUTO: 4.52 10*6/MM3 (ref 3.77–5.28)
RBC # UR: ABNORMAL /HPF
REF LAB TEST METHOD: ABNORMAL
SODIUM SERPL-SCNC: 143 MMOL/L (ref 136–145)
SP GR UR STRIP: 1.02 (ref 1–1.03)
SQUAMOUS #/AREA URNS HPF: ABNORMAL /HPF
UROBILINOGEN UR QL STRIP: ABNORMAL
WBC # BLD AUTO: 11.5 10*3/MM3 (ref 3.4–10.8)
WBC UR QL AUTO: ABNORMAL /HPF

## 2020-10-19 PROCEDURE — 87040 BLOOD CULTURE FOR BACTERIA: CPT | Performed by: INTERNAL MEDICINE

## 2020-10-19 PROCEDURE — 25010000002 MORPHINE PER 10 MG: Performed by: SURGERY

## 2020-10-19 PROCEDURE — 85025 COMPLETE CBC W/AUTO DIFF WBC: CPT | Performed by: INTERNAL MEDICINE

## 2020-10-19 PROCEDURE — 99233 SBSQ HOSP IP/OBS HIGH 50: CPT | Performed by: INTERNAL MEDICINE

## 2020-10-19 PROCEDURE — 82962 GLUCOSE BLOOD TEST: CPT

## 2020-10-19 PROCEDURE — 25010000002 ENOXAPARIN PER 10 MG: Performed by: SURGERY

## 2020-10-19 PROCEDURE — 81001 URINALYSIS AUTO W/SCOPE: CPT | Performed by: INTERNAL MEDICINE

## 2020-10-19 PROCEDURE — 25010000002 ONDANSETRON PER 1 MG: Performed by: SURGERY

## 2020-10-19 PROCEDURE — 99024 POSTOP FOLLOW-UP VISIT: CPT | Performed by: SURGERY

## 2020-10-19 PROCEDURE — 25010000002 PROMETHAZINE PER 50 MG: Performed by: NURSE PRACTITIONER

## 2020-10-19 PROCEDURE — 80053 COMPREHEN METABOLIC PANEL: CPT | Performed by: INTERNAL MEDICINE

## 2020-10-19 PROCEDURE — 25010000002 PIPERACILLIN SOD-TAZOBACTAM PER 1 G: Performed by: INTERNAL MEDICINE

## 2020-10-19 RX ORDER — ECHINACEA PURPUREA EXTRACT 125 MG
2 TABLET ORAL AS NEEDED
Status: DISCONTINUED | OUTPATIENT
Start: 2020-10-19 | End: 2020-10-19

## 2020-10-19 RX ORDER — LABETALOL HYDROCHLORIDE 5 MG/ML
20 INJECTION, SOLUTION INTRAVENOUS EVERY 4 HOURS PRN
Status: DISCONTINUED | OUTPATIENT
Start: 2020-10-19 | End: 2020-10-21 | Stop reason: HOSPADM

## 2020-10-19 RX ORDER — AZELASTINE 1 MG/ML
2 SPRAY, METERED NASAL DAILY
Status: DISCONTINUED | OUTPATIENT
Start: 2020-10-19 | End: 2020-10-21 | Stop reason: HOSPADM

## 2020-10-19 RX ORDER — SODIUM CHLORIDE 9 MG/ML
100 INJECTION, SOLUTION INTRAVENOUS CONTINUOUS
Status: DISCONTINUED | OUTPATIENT
Start: 2020-10-19 | End: 2020-10-19

## 2020-10-19 RX ADMIN — SODIUM CHLORIDE 12.5 MG: 900 INJECTION, SOLUTION INTRAVENOUS at 14:05

## 2020-10-19 RX ADMIN — Medication 10 ML: at 09:11

## 2020-10-19 RX ADMIN — SODIUM CHLORIDE 12.5 MG: 900 INJECTION, SOLUTION INTRAVENOUS at 05:22

## 2020-10-19 RX ADMIN — ENOXAPARIN SODIUM 40 MG: 40 INJECTION SUBCUTANEOUS at 17:09

## 2020-10-19 RX ADMIN — MORPHINE SULFATE 2 MG: 4 INJECTION INTRAVENOUS at 01:43

## 2020-10-19 RX ADMIN — LABETALOL 20 MG/4 ML (5 MG/ML) INTRAVENOUS SYRINGE 20 MG: at 18:00

## 2020-10-19 RX ADMIN — MORPHINE SULFATE 2 MG: 4 INJECTION INTRAVENOUS at 09:45

## 2020-10-19 RX ADMIN — MORPHINE SULFATE 2 MG: 4 INJECTION INTRAVENOUS at 18:00

## 2020-10-19 RX ADMIN — MORPHINE SULFATE 2 MG: 4 INJECTION INTRAVENOUS at 05:40

## 2020-10-19 RX ADMIN — ONDANSETRON 4 MG: 2 INJECTION INTRAMUSCULAR; INTRAVENOUS at 01:43

## 2020-10-19 RX ADMIN — ONDANSETRON 4 MG: 2 INJECTION INTRAMUSCULAR; INTRAVENOUS at 09:45

## 2020-10-19 RX ADMIN — SODIUM CHLORIDE 12.5 MG: 900 INJECTION, SOLUTION INTRAVENOUS at 21:28

## 2020-10-19 RX ADMIN — AZELASTINE HYDROCHLORIDE 2 SPRAY: 137 SPRAY, METERED NASAL at 17:09

## 2020-10-19 RX ADMIN — MORPHINE SULFATE 2 MG: 4 INJECTION INTRAVENOUS at 14:05

## 2020-10-19 RX ADMIN — MORPHINE SULFATE 2 MG: 4 INJECTION INTRAVENOUS at 22:06

## 2020-10-19 RX ADMIN — PIPERACILLIN AND TAZOBACTAM 3.38 G: 3; .375 INJECTION, POWDER, LYOPHILIZED, FOR SOLUTION INTRAVENOUS at 17:09

## 2020-10-19 NOTE — DISCHARGE PLACEMENT REQUEST
"Leonor Bellamy (70 y.o. Female)     Date of Birth Social Security Number Address Home Phone MRN    1950  [id number] [place] [address] [phone] [id number]    Hindu Marital Status          Unknown        Admission Date Admission Type Admitting Provider Attending Provider Department, Room/Bed    10/18/20 Urgent Jemma Bowling MD Kapadia, Shefali A, MD [facility] SURGICAL INPATIENT, 4124/1    Discharge Date Discharge Disposition Discharge Destination                       Attending Provider: Jemma Bowling MD    Allergies: Vancomycin, Sulfa Antibiotics    Isolation: None   Infection: None   Code Status: CPR    Ht: 177.8 cm (70\")   Wt: 76.2 kg (167 lb 15.9 oz)    Admission Cmt: None   Principal Problem: Small bowel obstruction (CMS/HCC) [K56.609]                 Active Insurance as of 10/18/2020     Primary Coverage     Payor Plan Insurance Group Employer/Plan Group    ANTHEM MEDICARE REPLACEMENT ANTHEM MEDICARE ADVANTAGE [id number]     Payor Plan Address Payor Plan Phone Number Payor Plan Fax Number Effective Dates    [address] [phone]  11/1/2019 - None Entered    [facility] [id number]       Subscriber Name Subscriber Birth Date Member ID       LEONOR BELLAMY 1950 [id number]           Secondary Coverage     Payor Plan Insurance Group Employer/Plan Group    [place]      Payor Plan Address Payor Plan Phone Number Payor Plan Fax Number Effective Dates    [address]   10/1/2020 - None Entered    [facility] [id number]       Subscriber Name Subscriber Birth Date Member ID       LEONOR BELLAMY 1950 [id number]                 Emergency Contacts      (Rel.) Home Phone Work Phone Mobile Phone    EDILBERTO BELLAMY (Spouse) [phone] -- [phone]    JOSESITOTORRI (Son) [phone] -- [phone]               History & Physical      Jesse Guerra MD at 10/18/20 0609                [facility] " Hospital Medicine Services      Patient Name: Leonor Macias  : 1950  MRN: [id number]  Primary Care Physician: Emmanuel Guzman MD  Date of admission: 10/18/2020    Patient Care Team:  Emmanuel Guzman MD as PCP - General (Family Medicine)          Subjective   History Present Illness     Chief Complaint: Small bowel obstruction      Ms. Macias is a 70 y.o. female with a history of ulcerative colitis/Crohn's disease, diabetes type 2, GERD, hyperlipidemia, hypertension, extensive abdominal surgeries presented to an outlying facility ([facility] in [facility]) for a sudden onset of acute abdominal pain, nausea, and vomiting.  Patient has an ileostomy and states that she has not had output since about 8 PM on 10/17/2020.  Patient states her pain is a 7 out of 10, intermittent, sharp, cramping, relieved with pain medication to a 3 out of 10.  Patient states she has had a ileostomy since  where she had a bowel resection with a an ileostomy placed.  Patient states she is also had multiple hernia repairs.  Vital signs: 141/61, temp 97.6, heart rate 80, respiratory rate 16, O2 saturation 96%  Patient denies fever, chills, cough, congestion, chest pain, or ill contacts.  Patient was [facility] for further management and evaluation.    Labs: Lactic acid 1.3, sodium 139, potassium 3.6, BUN 18, creatinine 1.21, glucose 114, ALT 26, AST 38, alkaline phosphatase 127, WBC 8.0, Hgb 15.1, HCT 46.0, platelets 242.    CT scan abdomen pelvis with IV contrast: Mild distal partial small bowel obstruction with transition site seen with enlarged parastomal hernia along the right ileostomy.,  Mild ascites, small cystocele.    Obtain CBC, CMP, consult general surgery, cardiac monitor, n.p.o.,    Nursing staff to verify home medications.       Review of Systems   Constitution: Negative.   HENT: Negative.    Eyes: Negative.    Cardiovascular: Negative.    Respiratory: Negative.    Endocrine:  Negative.    Hematologic/Lymphatic: Negative.    Skin: Negative.    Musculoskeletal: Negative.    Gastrointestinal: Positive for abdominal pain, nausea and vomiting.   Genitourinary: Negative.    Neurological: Negative.    Psychiatric/Behavioral: Negative.    Allergic/Immunologic: Negative.    All other systems reviewed and are negative.          Personal History     Past Medical History:   Past Medical History:   Diagnosis Date   • Crohn's disease (CMS/HCC)    • Diabetes mellitus (CMS/HCC)    • GERD (gastroesophageal reflux disease)    • Hyperlipidemia    • Hypertension        Surgical History:      Past Surgical History:   Procedure Laterality Date   • CHOLECYSTECTOMY     • COLON RESECTION WITH ILEOSTOMY  2012   • HERNIA REPAIR     • TONSILLECTOMY     • TUBAL ABDOMINAL LIGATION             Family History: family history includes Cancer in her brother, father, and mother; Diabetes in her father. Otherwise pertinent FHx was reviewed and unremarkable.     Social History:  reports that she has quit smoking. She has never used smokeless tobacco. She reports previous alcohol use. She reports that she does not use drugs.      Medications:  Prior to Admission medications    Not on File       Allergies:    Allergies   Allergen Reactions   • Vancomycin Unknown - High Severity   • Sulfa Antibiotics Unknown - Low Severity       Objective   Objective     Vital Signs  Temp:  [98 °F (36.7 °C)] 98 °F (36.7 °C)  Heart Rate:  [69] 69  Resp:  [16] 16  BP: (163)/(79) 163/79  SpO2:  [94 %] 94 %  on   ;   Device (Oxygen Therapy): room air  Body mass index is 24.01 kg/m².    Physical Exam  Vitals signs reviewed.   Constitutional:       Appearance: She is ill-appearing.   HENT:      Head: Normocephalic.      Nose: Nose normal.      Mouth/Throat:      Mouth: Mucous membranes are dry.   Eyes:      Conjunctiva/sclera: Conjunctivae normal.   Neck:      Musculoskeletal: Normal range of motion.   Cardiovascular:      Rate and Rhythm: Normal  rate and regular rhythm.   Pulmonary:      Effort: Pulmonary effort is normal.      Breath sounds: Normal breath sounds.   Abdominal:      General: There is distension.      Tenderness: There is abdominal tenderness. There is guarding.   Musculoskeletal: Normal range of motion.   Skin:     General: Skin is warm and dry.      Capillary Refill: Capillary refill takes less than 2 seconds.      Coloration: Skin is pale.   Neurological:      Mental Status: She is alert and oriented to person, place, and time.         Results Review:  I have personally reviewed most recent lab results, microbiology results and radiology images and interpretations and agree with findings,           Invalid input(s):  ALKPHOS  CrCl cannot be calculated (No successful lab value found.).  Brief Urine Lab Results     None          Microbiology Results (last 10 days)     ** No results found for the last 240 hours. **          ECG/EMG Results (most recent)     None                    No radiology results for the last 7 days      CrCl cannot be calculated (No successful lab value found.).    Assessment/Plan   Assessment/Plan       Active Hospital Problems    Diagnosis  POA   • **Small bowel obstruction (CMS/[facility]) [K56.609]  Yes   • Parastomal hernia [K43.5]  Yes   • COPD (chronic obstructive pulmonary disease) (CMS/HCC) [J44.9]  Yes   • Crohn's disease (CMS/HCC) [K50.90]  Yes   • Gastroesophageal reflux disease [K21.9]  Yes   • Diabetes mellitus (CMS/[facility]) [E11.9]  Yes      Resolved Hospital Problems   No resolved problems to display.     Bowel obstruction  -Keep n.p.o.  - IV fluids for hydration  -Antiemetics PRN  - Pain medication PRN  -General surgery consult    Hypertension  - Monitor BP  -Continue home medications once verified    COPD  -Not in acute exacerbation  - Monitor O2 saturations    GERD, chronic  - Continue PPI    Diabetes type 2  -Accu-Cheks before meals and at bedtime  -Sliding scale insulin as needed      *Patient's home  medications have not been verified at time of H&P, nursing staff to verify home medications          VTE Prophylaxis -   Mechanical Order History:      Ordered        10/18/20 0601  Place Sequential Compression Device  Once         10/18/20 0601  Maintain Sequential Compression Device  Continuous                 Pharmalogical Order History:     None          CODE STATUS:    Code Status and Medical Interventions:   Ordered at: 10/18/20 0601     Code Status:    CPR     Medical Interventions (Level of Support Prior to Arrest):    Full       This patient has been examined wearing appropriate Personal Protective Equipment . 10/18/20      I discussed the patient's findings and my recommendations with patient.        Electronically signed by RA Mays, 10/18/20, 6:09 AM EDT.  [facility] Hospitalist Team          Agree with above mentioned except my evaluation, assessment, plan and treatment supercedes that of [facility] or PA.  Ms. Macias is a 70 y.o. female with a history of ulcerative colitis/Crohn's disease, diabetes type 2, GERD, hyperlipidemia, hypertension, extensive abdominal surgeries presented to an outlying facility ([facility]) for a sudden onset of acute abdominal pain, nausea, and vomiting.  Patient has an ileostomy and states that she has not had output since about 8 PM on 10/17/2020.  Patient states her pain is a 7 out of 10, intermittent, sharp, cramping, relieved with pain medication to a 3 out of 10.  Patient states she has had a ileostomy since 2012 where she had a bowel resection with a an ileostomy placed.  Patient states she is also had multiple hernia repairs.  Vital signs: 141/61, temp 97.6, heart rate 80, respiratory rate 16, O2 saturation 96%  Patient denies fever, chills, cough, congestion, chest pain, or ill contacts.  Patient was transferred to [facility] for further management and evaluation.     Labs: Lactic acid 1.3, sodium 139, potassium 3.6, BUN  18, creatinine 1.21, glucose 114, ALT 26, AST 38, alkaline phosphatase 127, WBC 8.0, Hgb 15.1, HCT 46.0, platelets 242.     CT scan abdomen pelvis with IV contrast: Mild distal partial small bowel obstruction with transition site seen with enlarged parastomal hernia along the right ileostomy.,  Mild ascites, small cystocele.     Obtain CBC, CMP, consult general surgery, cardiac monitor, n.p.o.,      History of subtotal colectomy with ileostomy placement 10 years ago for Crohn's disease.    ROS:  Constitution: Negative.   HENT: Negative.    Eyes: Negative.    Cardiovascular: Negative.    Respiratory: Negative.    Endocrine: Negative.    Hematologic/Lymphatic: Negative.    Skin: Negative.    Musculoskeletal: Negative.    Gastrointestinal: Positive for abdominal pain, nausea and vomiting.   Genitourinary: Negative.    Neurological: Negative.    Psychiatric/Behavioral: Negative.    Allergic/Immunologic: Negative.    All other systems reviewed and are negative.    Noted      Physical Exam   Constitutional: Patient appears well-developed and well-nourished and in no acute distress patient is status postop now.  Very pleasant female.    HEENT:   Head: Normocephalic and atraumatic.   Eyes:  Pupils are equal, round, and reactive to light. EOM are intact. Sclera are anicteric and non-injected.  Mouth and Throat: Patient has moist mucous membranes. Oropharynx is clear of any erythema or exudate.       Neck: Neck supple.  No thyromegaly present. No lymphadenopathy present. No  masses.     Cardiovascular: Inspection: No JVD present. Palpation: No parasternal heave. Pedal pulses +1 left and absent right.  No leg edema. Auscultation: Regular rate, regular rhythm, S1 normal and S2 normal. reveals no gallop and no friction rub. No Carotid bruit bilaterally.    Pulmonary/Chest: Inspection: No distress, no use of accessory muscles. Lungs are clear to auscultation bilaterally. No respiratory distress. No wheezes. No rales.     Abdomen  /Gastrointestinal: Inspection: no distension. Palpation: no masses, no organomegaly. Soft. There is no tenderness. Bowel sounds are absent.  NG tube present.  Ileostomy left lower quadrant present.    Musculoskeletal: Normal Muscle tone. Age appropriate, no deformities.    Neurological: Patient is alert and oriented to person, place, and time. Cranial nerves II-XII are grossly intact with no focal deficits. Sensori-motor exam is normal. No cerebellar signs.    Skin: Skin is warm. No rash noted. Nails show no clubbing.  No cyanosis or erythema. No bruising.    Emotional Behavior:   Appropriate   Debilities:  Age appropriate      Active Hospital Problems    Diagnosis  POA   • **Small bowel obstruction (CMS/[facility]) [K56.609]  Yes     Priority: High   • Parastomal hernia with obstruction and without gangrene [K43.3]  Yes     Priority: Medium   • COPD (chronic obstructive pulmonary disease) (CMS/HCC) [J44.9]  Yes   • Crohn's disease (CMS/[facility]) [K50.90]  Yes   • Type 2 diabetes mellitus without complication, without long-term current use of insulin (CMS/[facility]) [E11.9]  Yes      Resolved Hospital Problems   No resolved problems to display.     SBO:  Resolved post surgery.    Postop ileus now.      Parastomal hernia:  Resolved.      COPD:  Stable      Crohn's disease:  Stable with previous complications.      Diabetes mellitus 2:  Not on any medication at home.  Continue to monitor and sliding scale            Electronically signed by Jesse Guerra MD at 10/18/20 1831       Referral Orders (last 24 hours) (24h ago, onward)     Start     Ordered    10/19/20 0000  Ambulatory Referral to Home Health     Question Answer Comment   Face to Face Visit Date: 10/19/2020    Follow-up provider for Plan of Care? I treated the patient in an acute care facility and will not continue treatment after discharge.    Follow-up provider: IKE BARILLAS    Reason/Clinical Findings s/p ileostomy    Describe mobility limitations that make leaving home  difficult: s/p ileostomy    Nursing/Therapeutic Services Requested Other eval and treat   Frequency: 1 Week 1        10/19/20 1141                Consult Orders (last 24 hours) (24h ago, onward)    None           Operative/Procedure Notes (last 72 hours) (Notes from 10/16/20 1141 through 10/19/20 1141)      Kenyon Genao MD at 10/18/20 1352          Operative Report:    Patient Name:  Leonor Macias  YOB: 1950    Date of Surgery:  10/18/2020     Indications: 70-year-old lady who was transferred from outside hospital the chief complaint worsening parastomal pain nausea and vomiting.  On arrival she was found to have an incarcerated parastomal hernia.  I counseled her about the risks and benefits of exploratory laparotomy reduction of parastomal hernia and since she had already had previous parastomal hernia repair plan for moving the stoma.  After discussion about the risk and benefits of surgery she understood and was willing to proceed.    Pre-op Diagnosis:   Small bowel obstruction (CMS/HCC) [K56.609]  Parastomal hernia with obstruction and without gangrene [K43.3]       Post-Op Diagnosis Codes:     * Small bowel obstruction (CMS/HCC) [K56.609]     * Parastomal hernia with obstruction and without gangrene [K43.3]    Procedure/CPT® Codes:      Procedure(s):  Exploratory laparotomy with lysis of adhesions  Reduction of incarcerated parastomal hernia  Small bowel resection  End ileostomy  Ventral hernia repair    Staff:  Surgeon(s):  Kenyon Genao MD          Anesthesia: General    Estimated Blood Loss: 100ml    Implants:    Implant Name Type Inv. Item Serial No.  Lot No. LRB No. Used Action   STPLR LNR CUT PROX 75MM BENNIE [id number] - [id number] Implant STPLR LNR CUT PROX 75MM BENNIE TLC75  [employer]  DIV OF J AND J [id number] N/A 1 Implanted       Specimen:          Specimens     ID Source Type Tests Collected By Collected At Frozen?      A Small Intestine Tissue · TISSUE PATHOLOGY  EXAM   Kenyon Genao MD 10/18/20 3162      Description: SMALL BOWEL    This specimen was not marked as sent.              Findings: Large parastomal hernia incarcerated small bowel was viable    Complications: None    Description of Procedure: Patient was taken to the operating room placed on the operating table in the supine position under general anesthesia.  Her abdomen was prepped and draped normal sterile fashion.  Timeout was performed identifying correct patient procedure site of operation.  I began the operation by making a midline laparotomy incision.  The fascia was incised the peritoneum was entered sharply there is no evidence of injury to underlying structures.  I then had to extend the midline incision inferiorly and lyse adhesions.  I then turned my attention to the parastomal hernia.  I performed a lysis of adhesions and was able to dissect the small bowel away from the previously placed parastomal hernia mesh.  By making a fascial incision I was able to reduce a large amount of proximal small bowel that was herniated through the parastomal defect.  It was dusky but over the course of 5 to 10 minutes pinked back up to normal.  I then excised the stoma exit site with the Bovie down to the level of the fascia.  During the process of detaching the small bowel from the previously placed mesh several partial-thickness injuries were unavoidable and made to the small bowel.  Therefore I selected a site proximal to this made a window in the mesentery and divided the bowel with a CON 75 staple load.  The mesentery was then divided using clamps and silk ties.  The specimen was then sent to pathology as small bowel.  I then performed a closure of the previous right lower quadrant hernia site by freeing the anterior fascia and then closing the posterior fascia including the previously placed what appeared to be Stratus mesh using interrupted figure-of-eight 0 Prolene sutures.  Through the skin defect I then  closed the anterior fascia using interrupted 0 Prolene sutures.  I then selected a site on the left upper quadrant and excised bit of skin.  The anterior posterior fascia were incised and muscle was split and the small bowel was brought through this defect.  The abdomen was inspected there is no evidence of bleeding.  The abdomen and skin were irrigated using Aricept and then saline.  We then changed gowns and gloves.  I closed the abdominal fascia using #1 looped PDS.  The skin was closed with staples.  The stoma site was closed with staples.  I then matured the small bowel ileostomy as an Endoloop using 3-0 Vicryl sutures.  At the end of the case the counts were correct the patient was transferred to recovery in good condition.      Kenyon Genao MD     Date: 10/18/2020  Time: 15:50 EDT    This note was created using Dragon Voice Recognition software.    Electronically signed by Kenyon Genao MD at 10/18/20 1556       Physician Progress Notes (last 24 hours) (Notes from 10/18/20 1142 through 10/19/20 1142)    No notes of this type exist for this encounter.            Consult Notes (last 24 hours) (Notes from 10/18/20 1142 through 10/19/20 1142)      Kenyon Genao MD at 10/18/20 1228      Consult Orders    1. Inpatient General Surgery Consult [[id number]] ordered by Anthony Levine APRN               Inpatient General Surgery Consult  Consult performed by: Kenyon Genao MD  Consult ordered by: Anthony Levine APRN  Reason for consult: parastomal hernia          General Surgery Consult Note      Subjective     70-year-old lady with a history of Crohn's disease who underwent colon resection in 2012 with end ileostomy and developed a parastomal hernia which was revised about 2 years ago who presented to an outside emergency department with a chief complaint of worsening abdominal pain.  She says the pain is around her stoma.  It is sharp and crampy and intermittent.  It is associated with nausea  and vomiting.  Her ileostomy output basically stopped after yesterday evening.  She says that she has had a small parastomal hernia that got quite a bit larger yesterday has been more firm than normal.  At the outside hospital CT scan demonstrated a parastomal hernia with evidence of bowel obstruction.  She was then transferred here and I was asked to see her this morning.    History  Past Medical History:   Diagnosis Date   • Crohn's disease (CMS/HCC)    • Diabetes mellitus (CMS/HCC)    • GERD (gastroesophageal reflux disease)    • Hyperlipidemia    • Hypertension      Past Surgical History:   Procedure Laterality Date   • CHOLECYSTECTOMY     • COLON RESECTION WITH ILEOSTOMY  2012   • HERNIA REPAIR     • TONSILLECTOMY     • TUBAL ABDOMINAL LIGATION       Family History   Problem Relation Age of Onset   • Cancer Mother    • Cancer Father    • Diabetes Father    • Cancer Brother      Social History     Tobacco Use   • Smoking status: Former Smoker   • Smokeless tobacco: Never Used   Substance Use Topics   • Alcohol use: Not Currently   • Drug use: Never     No medications prior to admission.     Allergies:  Vancomycin and Sulfa antibiotics    Review of Systems   Constitutional: Positive for chills. Negative for fever.   HENT: Negative for sore throat and trouble swallowing.    Eyes: Negative for blurred vision and double vision.   Respiratory: Negative for cough and shortness of breath.    Cardiovascular: Negative for chest pain and leg swelling.   Gastrointestinal: Positive for abdominal pain, constipation, nausea and vomiting. Negative for abdominal distention, blood in stool and diarrhea.   Genitourinary: Negative for dysuria and hematuria.   Skin: Negative for rash and bruise.   Neurological: Negative for dizziness and confusion.   Psychiatric/Behavioral: Negative for agitation and depressed mood.        Objective     Vital Signs  Temp:  [98 °F (36.7 °C)-98.8 °F (37.1 °C)] 98.3 °F (36.8 °C)  Heart Rate:  [69-96]  96  Resp:  [15-22] 15  BP: (163-167)/(78-90) 167/90    Physical Exam  Constitutional:       Appearance: She is well-developed.      Comments: Uncomfortable   HENT:      Head: Normocephalic and atraumatic.   Eyes:      General: No scleral icterus.     Conjunctiva/sclera: Conjunctivae normal.   Neck:      Musculoskeletal: No muscular tenderness.      Trachea: No tracheal deviation.   Cardiovascular:      Rate and Rhythm: Normal rate.      Pulses: Normal pulses.   Pulmonary:      Effort: Pulmonary effort is normal. No respiratory distress.   Abdominal:      Comments: Healed midline incision.  Right lower quadrant ileostomy with large parastomal hernia that is firm it is not reducible.  The ileostomy is viable.   Musculoskeletal:         General: No swelling or tenderness.   Lymphadenopathy:      Cervical: No cervical adenopathy.   Skin:     General: Skin is warm and dry.   Neurological:      General: No focal deficit present.      Mental Status: She is alert. Mental status is at baseline.   Psychiatric:         Mood and Affect: Mood normal.         Behavior: Behavior normal.         Results Review:  Lab Results (last 24 hours)     Procedure Component Value Units Date/Time    POC Glucose Once [[id number]]  (Abnormal) Collected: 10/18/20 1121    Specimen: Blood Updated: 10/18/20 1125     Glucose 119 mg/dL      Comment: Serial Number: [id number]:  [id number]       POC Glucose Once [[id number]]  (Abnormal) Collected: 10/18/20 0747    Specimen: Blood Updated: 10/18/20 0748     Glucose 132 mg/dL      Comment: Serial Number: [id number]:  [id number]       Comprehensive Metabolic Panel [[id number]]  (Abnormal) Collected: 10/18/20 0711    Specimen: Blood Updated: 10/18/20 0743     Glucose 140 mg/dL      BUN 12 mg/dL      Creatinine 0.87 mg/dL      Sodium 140 mmol/L      Potassium 4.1 mmol/L      Chloride 102 mmol/L      CO2 26.0 mmol/L      Calcium 9.5 mg/dL      Total Protein 7.0 g/dL      Albumin 4.30 g/dL      ALT  (SGPT) 24 U/L      AST (SGOT) 26 U/L      Alkaline Phosphatase 113 U/L      Total Bilirubin 0.5 mg/dL      eGFR Non African Amer 65 mL/min/1.73      Globulin 2.7 gm/dL      A/G Ratio 1.6 g/dL      BUN/Creatinine Ratio 13.8     Anion Gap 12.0 mmol/L     Narrative:      GFR Normal >60  Chronic Kidney Disease <60  Kidney Failure <15      aPTT [[id number]]  (Normal) Collected: 10/18/20 0711    Specimen: Blood Updated: 10/18/20 0734     PTT 24.9 seconds     Protime-INR [[id number]]  (Normal) Collected: 10/18/20 0711    Specimen: Blood Updated: 10/18/20 0734     Protime 11.0 Seconds      INR 1.00    CBC Auto Differential [[id number]]  (Abnormal) Collected: 10/18/20 0711    Specimen: Blood Updated: 10/18/20 0730     WBC 8.70 10*3/mm3      RBC 4.71 10*6/mm3      Hemoglobin 14.2 g/dL      Hematocrit 42.7 %      MCV 90.6 fL      MCH 30.1 pg      MCHC 33.2 g/dL      RDW 14.2 %      RDW-SD 44.6 fl      MPV 9.2 fL      Platelets 220 10*3/mm3      Neutrophil % 77.5 %      Lymphocyte % 12.8 %      Monocyte % 9.1 %      Eosinophil % 0.2 %      Basophil % 0.4 %      Neutrophils, Absolute 6.70 10*3/mm3      Lymphocytes, Absolute 1.10 10*3/mm3      Monocytes, Absolute 0.80 10*3/mm3      Eosinophils, Absolute 0.00 10*3/mm3      Basophils, Absolute 0.00 10*3/mm3      nRBC 0.0 /100 WBC         Imaging Results (Last 24 Hours)     ** No results found for the last 24 hours. **          I reviewed the patient's other test results and agree with the interpretation  I reviewed the patient's new imaging results and agree with the interpretation.    Assessment/Plan     Principal Problem:    Small bowel obstruction (CMS/HCC)  Active Problems:    Diabetes mellitus (CMS/HCC)    Gastroesophageal reflux disease    Parastomal hernia    COPD (chronic obstructive pulmonary disease) (CMS/HCC)    Crohn's disease (CMS/HCC)      Parastomal hernia with incarceration and bowel obstruction.  I counseled her about the treatment options which are going to include  exploratory laparotomy with reduction of the parastomal hernia and either revision of the parastomal hernia versus moving the ileostomy.  The general risks of surgery have been discussed including midline issues like recurrent hernia bleeding infection incidental injury to the small bowel bowel resection and recurrent parastomal hernia in the future.  After our discussion she does understand the risks of the procedure and is willing to proceed.    We will go ahead and place nasogastric tube for nausea and vomiting.  Consent for exploratory laparotomy with parastomal hernia repair.  We will be moving towards the operating room shortly    This note was created using Dragon Voice Recognition software.    Kenyon Genao MD  10/18/20  12:29 EDT      Electronically signed by Kenyon Genao MD at 10/18/20 8277

## 2020-10-19 NOTE — PLAN OF CARE
Goal Outcome Evaluation:  Plan of Care Reviewed With: patient      Patient still complaining of pain. Patient refuses to get out of bed. Patient still has a stuart cath. Pushed for patient to use IS. Started IV Zosyn. Will continue to monitor.

## 2020-10-19 NOTE — PLAN OF CARE
Goal Outcome Evaluation:    Patient complains of significant abdominal pain that worsens when moving, treating per MAR. She also complains of intermittent nausea, zofran not lasting long enough, got orders for phenergan. Only very small amount of sanguinous output in ileostomy so far. NGT in place with small amount of brown output.

## 2020-10-19 NOTE — DISCHARGE PLACEMENT REQUEST
"Leonor Macias (70 y.o. Female)     Date of Birth Social Security Number Address Home Phone MRN    1950  [id number] [place] [address] [phone] [id number]    Hinduism Marital Status          Unknown        Admission Date Admission Type Admitting Provider Attending Provider Department, Room/Bed    10/18/20 Urgent Jemma Bowling MD Kapadia, Shefali A, MD [facility] SURGICAL INPATIENT, 4124/1    Discharge Date Discharge Disposition Discharge Destination                       Attending Provider: Jemma Bowling MD    Allergies: Vancomycin, Sulfa Antibiotics    Isolation: None   Infection: None   Code Status: CPR    Ht: 177.8 cm (70\")   Wt: 76.2 kg (167 lb 15.9 oz)    Admission Cmt: None   Principal Problem: Small bowel obstruction (CMS/HCC) [K56.609]                 Active Insurance as of 10/18/2020     Primary Coverage     Payor Plan Insurance Group Employer/Plan Group    ANTHEM MEDICARE REPLACEMENT ANTHEM MEDICARE ADVANTAGE [id number]     Payor Plan Address Payor Plan Phone Number Payor Plan Fax Number Effective Dates    [address] [phone]  11/1/2019 - None Entered    [facility] [address]       Subscriber Name Subscriber Birth Date Member ID       LEONOR MACIAS 1950 [id number]           Secondary Coverage     Payor Plan Insurance Group Employer/Plan Group    [place]      Payor Plan Address Payor Plan Phone Number Payor Plan Fax Number Effective Dates    [address]   10/1/2020 - None Entered    [place] [address]       Subscriber Name Subscriber Birth Date Member ID       LEONOR MACIAS 1950 [id number]                 Emergency Contacts      (Rel.) Home Phone Work Phone Mobile Phone    EDILBERTO MACIAS (Spouse) [phone] -- [phone]    TORRI MACIAS (Son) [phone] -- [phone]            Emergency Contact Information     Name Relation Home Work Mobile    EDILBERTO MACIAS Spouse " [phone]    TORRI BELLAMY [phone]          Insurance Information                ANTHEM MEDICARE REPLACEMENT/ANTH MEDICARE ADVANTAGE Phone: [phone]    Subscriber: Leonor Bellamy Subscriber#: [id number]    Group#: [id number] Precert#:         [place] Phone:     Subscriber: Leonor Bellamy Subscriber#: [id number]    Group#:  Precert#:

## 2020-10-19 NOTE — PROGRESS NOTES
[facility] Medicine Services Daily Progress Note          Hospitalist Team  LOS 1 days      Patient Care Team:  Emmanuel Guzman MD as PCP - General (Family Medicine)  Kenyon Genao MD as Consulting Physician (General Surgery)    Patient Location: [facility]/1      Subjective   Subjective     Chief Complaint / Subjective  Small bowel obstruction      Brief Synopsis of Hospital Course/HPI  Ms. Macias is a 70 y.o. female with a history of ulcerative colitis/Crohn's disease, diabetes type 2, GERD, hyperlipidemia, hypertension, extensive abdominal surgeries presented to an outlying facility ([facility]) for a sudden onset of acute abdominal pain, nausea, and vomiting.  Patient has an ileostomy and states that she has not had output since about 8 PM on 10/17/2020.  Patient states her pain is a 7 out of 10, intermittent, sharp, cramping, relieved with pain medication to a 3 out of 10.  Patient states she has had a ileostomy since 2012 where she had a bowel resection with a an ileostomy placed.  Patient states she is also had multiple hernia repairs.  Vital signs: 141/61, temp 97.6, heart rate 80, respiratory rate 16, O2 saturation 96%  Patient denies fever, chills, cough, congestion, chest pain, or ill contacts.  Patient was transferred to [facility] for further management and evaluation.     Labs: Lactic acid 1.3, sodium 139, potassium 3.6, BUN 18, creatinine 1.21, glucose 114, ALT 26, AST 38, alkaline phosphatase 127, WBC 8.0, Hgb 15.1, HCT 46.0, platelets 242.     CT scan abdomen pelvis with IV contrast: Mild distal partial small bowel obstruction with transition site seen with enlarged parastomal hernia along the right ileostomy.,  Mild ascites, small cystocele.      Date::   10/19/2020:  Pt did note that she felt flushed and hot overnight, She had a Tmax of 101.1 F per review of vitals. She denies any dysuria, but has had a productive cough with yellow sputum with  "blood tinges, and constant post-nasal drainage, even prior to admission.  She denies any congestion or wheezing. She does not take any antihistamine.      Review of Systems   Constitution: Positive for fever. Negative for chills, diaphoresis and night sweats.   HENT: Negative for congestion.         Post-nasaldrainage   Cardiovascular: Negative for chest pain, dyspnea on exertion, leg swelling and palpitations.   Respiratory: Positive for cough and sputum production. Negative for hemoptysis, shortness of breath and wheezing.    Skin: Negative.    Musculoskeletal: Negative.    Gastrointestinal: Positive for abdominal pain. Negative for constipation, diarrhea, nausea and vomiting.   Genitourinary: Negative for dysuria, frequency, hematuria and urgency.   Neurological: Negative.    Psychiatric/Behavioral: Negative.          Objective   Objective      Vital Signs  Temp:  [97.2 °F (36.2 °C)-101.1 °F (38.4 °C)] 98.4 °F (36.9 °C)  Heart Rate:  [] 104  Resp:  [14-21] 16  BP: (138-179)/(55-80) 179/73  Oxygen Therapy  SpO2: 94 %  Pulse Oximetry Type: Intermittent  Device (Oxygen Therapy): aerosol mask  Flow (L/min): 2  Flowsheet Rows      First Filed Value   Admission Height  177.8 cm (70\") Documented at 10/18/2020 0606   Admission Weight  75.9 kg (167 lb 5.3 oz) Documented at 10/18/2020 0606        Intake & Output (last 3 days)       10/16 0701 - 10/17 0700 10/17 0701 - 10/18 0700 10/18 0701 - 10/19 0700 10/19 0701 - 10/20 0700    I.V. (mL/kg)   2500 (32.8)     Total Intake(mL/kg)   2500 (32.8)     Urine (mL/kg/hr)   1200 (0.7)     Emesis/NG output   175     Stool   0     Total Output   1375     Net   +1125                 Lines, Drains & Airways    Active LDAs     Name:   Placement date:   Placement time:   Site:   Days:    Peripheral IV 10/18/20 0634 Anterior;Proximal;Right Forearm   10/18/20    0634    Forearm   1    NG/OG Tube Nasogastric Left nostril   10/18/20    1500 placed in OR    Left nostril   less than 1    " Ileostomy Standard (Radha, end) LUQ   10/18/20    1500    LUQ above skin. pink   less than 1    Urethral Catheter   10/18/20    1500 in OR     less than 1                  Physical Exam:  General: well-developed and well-nourished, NAD  HEENT: NC/AT, EOMI, PERRLA, no sinus tenderness to palpation  Heart: RRR. No murmur   Chest: CTAB, no w/r/r, normal respiratory effort  Abdominal: Soft. ND. Bowel sounds present. Ostomy site non-inflammed. Incisions covered.  Musculoskeletal: Normal ROM.  No edema. No calf tenderness.  Neurological: AAOx3, no focal deficits  Skin: Skin is warm and dry. No rash  Psychiatric: Normal mood and affect.       Wounds (last 24 hours)      LDA Wound     Row Name 10/19/20 0707 10/19/20 0300 10/18/20 2300       Wound 10/18/20 1352 midline abdomen Incision    Wound - Properties Group Placement Date: 10/18/20  -EW Placement Time: 1352  -EW Orientation: midline  -EW Location: abdomen  -EW Primary Wound Type: Incision  -EW    Dressing Appearance  intact;dried drainage  -AH  intact  -HT  intact  -HT    Closure  SHALA  -AH  SHALA  -HT  SHALA  -HT    Base  dressing in place, unable to visualize  -AH  dressing in place, unable to visualize  -HT  dressing in place, unable to visualize  -HT    Retired Wound - Properties Group Date first assessed: 10/18/20  -EW Time first assessed: 1352 -EW Location: abdomen  -EW Primary Wound Type: Incision  -EW       Wound 10/18/20 1547 abdomen    Wound - Properties Group Placement Date: 10/18/20  -SC Placement Time: 1547 -SC Location: abdomen  -SC    Dressing Appearance  --  area marked;intact  -HT  area marked;intact  -HT    Closure  SHALA  -AH  SHALA  -HT  SHALA  -HT    Base  dressing in place, unable to visualize  -AH  dressing in place, unable to visualize  -HT  dressing in place, unable to visualize  -HT    Retired Wound - Properties Group Date first assessed: 10/18/20  -SC Time first assessed: 1547 -SC Location: abdomen  -SC    Row Name 10/18/20 1920 10/18/20 1656  10/18/20 1600       Wound 10/18/20 1352 midline abdomen Incision    Wound - Properties Group Placement Date: 10/18/20  -EW Placement Time: 1352 -EW Orientation: midline  -EW Location: abdomen  -EW Primary Wound Type: Incision  -EW    Dressing Appearance  intact;other (see comments);area marked shadow drainage and sm moist drainage  -HT  --  intact;dry coverderms x2. one midline other over old ileostomy site  -NL    Closure  SHALA  -HT  --  SHALA  -NL    Base  dressing in place, unable to visualize  -HT  --  --    Retired Wound - Properties Group Date first assessed: 10/18/20  -EW Time first assessed: 1352 -EW Location: abdomen  -EW Primary Wound Type: Incision  -EW       Wound 10/18/20 1547 abdomen    Wound - Properties Group Placement Date: 10/18/20  -SC Placement Time: 1547  -SC Location: abdomen  -SC    Dressing Appearance  area marked;intact  -HT  area marked;intact;dry midline dressing dry and intact. small amt shdow drainage on coverderm at old ileostomy site  -NL  --    Closure  SHALA  -HT  --  --    Base  dressing in place, unable to visualize  -HT  --  --    Retired Wound - Properties Group Date first assessed: 10/18/20  -SC Time first assessed: 1547  -SC Location: abdomen  -SC    Row Name 10/18/20 1547             Wound 10/18/20 1352 midline abdomen Incision    Wound - Properties Group Placement Date: 10/18/20  -EW Placement Time: 1352 -EW Orientation: midline  -EW Location: abdomen  -EW Primary Wound Type: Incision  -EW    Retired Wound - Properties Group Date first assessed: 10/18/20  -EW Time first assessed: 1352  -EW Location: abdomen  -EW Primary Wound Type: Incision  -EW       Wound 10/18/20 1547 abdomen    Wound - Properties Group Placement Date: 10/18/20  -SC Placement Time: 1547 -SC Location: abdomen  -SC    Dressing Care  dressing applied covaderm, colostomy appliance  -SC      Retired Wound - Properties Group Date first assessed: 10/18/20  -SC Time first assessed: 1547  -SC Location: abdomen  -SC       User Key  (r) = Recorded By, (t) = Taken By, (c) = Cosigned By    Initials Name Provider Type    Mohan Kaur, RN Registered Nurse    Paige Blackman RN Registered Nurse    Ivette Cox, RN Registered Nurse    Martina Becker RN Registered Nurse    Rosemarie Saucedo LPN Licensed Nurse          Procedures:  Procedure(s):  LAPAROTOMY EXPLORATOR AND END ILEOSTOMY  VENTRAL/INCISIONAL HERNIA REPAIR  COLON RESECTION SMALL BOWEL    Procedure(s):  LAPAROTOMY EXPLORATOR AND END ILEOSTOMY  VENTRAL/INCISIONAL HERNIA REPAIR  COLON RESECTION SMALL BOWEL  -------------------       Results Review:     I reviewed the patient's new clinical results.    Results from last 7 days   Lab Units 10/19/20  0952 10/18/20  0711   WBC 10*3/mm3 11.50* 8.70   HEMOGLOBIN g/dL 13.4 14.2   HEMATOCRIT % 41.1 42.7   PLATELETS 10*3/mm3 211 220     Results from last 7 days   Lab Units 10/19/20  0952 10/18/20  0711   SODIUM mmol/L 143 140   POTASSIUM mmol/L 4.1 4.1   CHLORIDE mmol/L 108* 102   CO2 mmol/L 24.0 26.0   BUN mg/dL 11 12   CREATININE mg/dL 0.73 0.87   GLUCOSE mg/dL 147* 140*   ALBUMIN g/dL 3.40* 4.30   BILIRUBIN mg/dL 0.7 0.5   ALK PHOS U/L 89 113   AST (SGOT) U/L 33* 26   ALT (SGPT) U/L 30 24   CALCIUM mg/dL 9.2 9.5     Cr Clearance Estimated Creatinine Clearance: 81 mL/min (by C-G formula based on SCr of 0.73 mg/dL).    Coag   Results from last 7 days   Lab Units 10/18/20  0711   INR  1.00   APTT seconds 24.9       HbA1C No results found for: HGBA1C  Blood Glucose   Results from last 7 days   Lab Units 10/19/20  1136 10/19/20  0734 10/19/20  0602 10/18/20  2207 10/18/20  1822 10/18/20  1726   GLUCOSE mg/dL 115* 124* 129* 136* 149* 157*       Troponin     Lipids  No results found for: CHOL, CHLPL, TRIG, HDL, LDL, LDLDIRECT    UA          Microbiology       ABG        EKG  No orders to display       Imaging:  No radiology results for the last 7 days          Xrays, labs reviewed personally by physician.    Medication  Review:   I have reviewed the patient's current medication list      Scheduled Meds  insulin lispro, 0-9 Units, Subcutaneous, TID AC  sodium chloride, 10 mL, Intravenous, Q12H        Meds Infusions       Meds PRN  •  acetaminophen **OR** acetaminophen **OR** acetaminophen  •  dextrose  •  dextrose  •  glucagon (human recombinant)  •  insulin lispro **AND** insulin lispro  •  melatonin  •  Morphine  •  ondansetron **OR** ondansetron  •  oxyCODONE  •  promethazine  •  sodium chloride      Assessment/Plan   Assessment/Plan     Active Hospital Problems    Diagnosis  POA   • **Small bowel obstruction (CMS/[facility]) [K56.609]  Yes   • COPD (chronic obstructive pulmonary disease) (CMS/[facility]) [J44.9]  Yes   • Crohn's disease (CMS/[facility]) [K50.90]  Yes   • Parastomal hernia with obstruction and without gangrene [K43.3]  Yes   • Type 2 diabetes mellitus without complication, without long-term current use of insulin (CMS/[facility]) [E11.9]  Yes      Resolved Hospital Problems   No resolved problems to display.       MEDICAL DECISION MAKING COMPLEXITY BY PROBLEM:     Small bowel obstruction  -Parastomal hernia with incarceration and bowel obstruction per surgeon  -10/18/2020 s/p exploratory laparotomy with lysis of adhesions, reduction of incarcerated parastomal hernia, small bowel resection, end ileostomy, ventral hernia repair     - s/p perioperative cefazolin  -NG tube to suction for now, patient remains NPO    Postop fever  -T-max 101.1 °F overnight  -Patient coughing up yellow sputum; patient having postnasal drainage     -Noncompliant with IS per nurse  -Check UA/C&S, blood cultures x2 sets, CBC, CMP, sputum culture, lactic acid  -Start empiric Zosyn to cover broadly for possible intra-abdominal versus other infection source  -Start Astelin and Ocean spray nasal sprays for postnasal drainage  -Encouraged patient to continue incentive spirometry to prevent pneumonia  -Continue to monitor vitals    DM type II  -No home medication  documented  -Accu-Cheks before meals and at bedtime, or every 6 hours while NPO  -SSI coverage as needed    Hypertension  -Currently NPO  -No home medications documented  -Continue to monitor BP and other vitals    COPD  -No acute exacerbation  -Continue to monitor O2 sats and vitals    GERD  -Continue pantoprazole        VTE Prophylaxis  Mechanical Order History:      Ordered        10/18/20 1308  SCD (Sequential Compression Device) - Place on Patient in Pre-Op  Once,   Status:  Canceled         10/18/20 1228  Place Sequential Compression Device on Patient in Pre-Op  Once         10/18/20 0601  Place Sequential Compression Device  Once         10/18/20 0601  Maintain Sequential Compression Device  Continuous                 Pharmalogical Order History:     None          Code Status  Code Status and Medical Interventions:   Ordered at: 10/18/20 0601     Code Status:    CPR     Medical Interventions (Level of Support Prior to Arrest):    Full       This patient has been examined wearing appropriate Personal Protective Equipment. 10/19/20      Discharge Planning    Pending post-operative course.      Electronically signed by Jemma Bowling MD, 10/19/20, 14:41 EDT.    [facility] Hospitalist Team

## 2020-10-19 NOTE — PROGRESS NOTES
Discharge Planning Assessment  [facility]     Patient Name: Leonor Macias  MRN: [id number]  Today's Date: 10/19/2020    Admit Date: 10/18/2020    Discharge Needs Assessment     Row Name 10/19/20 1136       Living Environment    Lives With  grandchild(abena);spouse    Current Living Arrangements  home/apartment/condo    Primary Care Provided by  self    Provides Primary Care For  no one    Family Caregiver if Needed  sibling(s)    Quality of Family Relationships  helpful    Able to Return to Prior Arrangements  yes       Resource/Environmental Concerns    Resource/Environmental Concerns  none    Transportation Concerns  car, none       Transition Planning    Patient/Family Anticipates Transition to  home with family;home with help/services    Patient/Family Anticipated Services at Transition  none    Transportation Anticipated  family or friend will provide       Discharge Needs Assessment    Readmission Within the Last 30 Days  no previous admission in last 30 days    Equipment Currently Used at Home  walker, standard;cane, straight    Concerns to be Addressed  denies needs/concerns at this time    Anticipated Changes Related to Illness  none    Equipment Needed After Discharge  none        Discharge Plan     Row Name 10/19/20 1010       Plan    Plan  D/C Plan: Home with family and [employer] (pending acceptance, order placed, notified office).    Provided Post Acute Provider List?  Refused    Refused Provider List Comment  has used [facility] in the past    Plan Comments   spoke to patient at bedside wearing mask and goggles and keeping distance greater than 6 feet. Patient lives with spouse and granddaughter, is IADLs and drives. PCP and pharmacy verified-denies any difficulty affording meds. Patient would like referral to [facility] as she has used in the past-order placed, called and spoke to [facility], and records faxed via [employer]. Barrier to D/C: NG tube, s/p  ileostomy and small bowel resection.        Continued Care and Services - Admitted Since 10/18/2020     Home Medical Care     Service Provider Request Status Selected Services Address Phone Fax    [facility]  [facility] - Request Sent N/A [address] [place] [address] [phone]              Expected Discharge Date and Time     Expected Discharge Date Expected Discharge Time    Oct 21, 2020         Demographic Summary     Row Name 10/19/20 1136       General Information    Admission Type  inpatient    Arrived From  operating room    Referral Source  admission list    Reason for Consult  discharge planning    Preferred Language  English     Used During This Interaction  no        Functional Status     Row Name 10/19/20 1136       Functional Status    Usual Activity Tolerance  good    Current Activity Tolerance  moderate       Functional Status, IADL    Medications  independent    Meal Preparation  independent    Housekeeping  independent    Laundry  independent    Shopping  independent       Mental Status    General Appearance WDL  WDL       Mental Status Summary    Recent Changes in Mental Status/Cognitive Functioning  no changes          Cristin Moore

## 2020-10-19 NOTE — PROGRESS NOTES
General Surgery Progress Note     LOS: 1 day   Patient Care Team:  Emmanuel Guzman MD as PCP - General (Family Medicine)  Kenyon Genao MD as Consulting Physician (General Surgery)    Subjective     Interval History:   No major events overnight.  Pain seems to be reasonably well controlled.  No nausea or vomiting but does have a nasogastric tube in place has been low output.  No ileostomy output yet.  Has been reluctant to get out of bed due to abdominal discomfort.    Objective     Vital Signs  Temp:  [97.2 °F (36.2 °C)-101.1 °F (38.4 °C)] 98.4 °F (36.9 °C)  Heart Rate:  [] 104  Resp:  [14-21] 16  BP: (140-179)/(55-80) 179/73    Physical Exam  Constitutional:       Appearance: Normal appearance.   HENT:      Head: Normocephalic and atraumatic.   Cardiovascular:      Comments: Mild tachycardia  Pulmonary:      Effort: Pulmonary effort is normal. No respiratory distress.   Abdominal:      Comments: Soft appropriately tender ileostomy is pink and viable   Skin:     General: Skin is warm and dry.   Neurological:      General: No focal deficit present.      Mental Status: She is alert. Mental status is at baseline.   Psychiatric:         Mood and Affect: Mood normal.         Behavior: Behavior normal.            Results Review:    Lab Results (last 24 hours)     Procedure Component Value Units Date/Time    POC Glucose Once [[id number]]  (Abnormal) Collected: 10/19/20 1136    Specimen: Blood Updated: 10/19/20 1138     Glucose 115 mg/dL      Comment: Serial Number: [id number]:  [id number]       Comprehensive Metabolic Panel [[id number]]  (Abnormal) Collected: 10/19/20 0952    Specimen: Blood Updated: 10/19/20 1040     Glucose 147 mg/dL      BUN 11 mg/dL      Creatinine 0.73 mg/dL      Sodium 143 mmol/L      Potassium 4.1 mmol/L      Comment: Slight hemolysis detected by analyzer. Results may be affected.        Chloride 108 mmol/L      CO2 24.0 mmol/L      Calcium 9.2 mg/dL      Total Protein 5.9 g/dL       Albumin 3.40 g/dL      ALT (SGPT) 30 U/L      AST (SGOT) 33 U/L      Alkaline Phosphatase 89 U/L      Total Bilirubin 0.7 mg/dL      eGFR Non African Amer 79 mL/min/1.73      Globulin 2.5 gm/dL      A/G Ratio 1.4 g/dL      BUN/Creatinine Ratio 15.1     Anion Gap 11.0 mmol/L     Narrative:      GFR Normal >60  Chronic Kidney Disease <60  Kidney Failure <15      CBC & Differential [[id number]]  (Abnormal) Collected: 10/19/20 0952    Specimen: Blood Updated: 10/19/20 1014    Narrative:      The following orders were created for panel order CBC & Differential.  Procedure                               Abnormality         Status                     ---------                               -----------         ------                     CBC Auto Differential[[id number]]        Abnormal            Final result                 Please view results for these tests on the individual orders.    CBC Auto Differential [[id number]]  (Abnormal) Collected: 10/19/20 0952    Specimen: Blood Updated: 10/19/20 1014     WBC 11.50 10*3/mm3      RBC 4.52 10*6/mm3      Hemoglobin 13.4 g/dL      Hematocrit 41.1 %      MCV 90.9 fL      MCH 29.8 pg      MCHC 32.7 g/dL      RDW 14.3 %      RDW-SD 45.1 fl      MPV 9.0 fL      Platelets 211 10*3/mm3      Neutrophil % 77.1 %      Lymphocyte % 9.4 %      Monocyte % 12.2 %      Eosinophil % 0.9 %      Basophil % 0.4 %      Neutrophils, Absolute 8.80 10*3/mm3      Lymphocytes, Absolute 1.10 10*3/mm3      Monocytes, Absolute 1.40 10*3/mm3      Eosinophils, Absolute 0.10 10*3/mm3      Basophils, Absolute 0.00 10*3/mm3      nRBC 0.0 /100 WBC     Blood Culture - Blood, Hand, Left [[id number]] Collected: 10/19/20 0952    Specimen: Blood from Hand, Left Updated: 10/19/20 1011    Blood Culture - Blood, Hand, Right [[id number]] Collected: 10/19/20 0952    Specimen: Blood from Hand, Right Updated: 10/19/20 1011    Tissue Pathology Exam [[id number]] Collected: 10/18/20 1507    Specimen: Tissue from Small Intestine  Updated: 10/19/20 0904    POC Glucose Once [[id number]]  (Abnormal) Collected: 10/19/20 0734    Specimen: Blood Updated: 10/19/20 0737     Glucose 124 mg/dL      Comment: Serial Number: [id number]:  [id number]       POC Glucose Once [[id number]]  (Abnormal) Collected: 10/19/20 0602    Specimen: Blood Updated: 10/19/20 0603     Glucose 129 mg/dL      Comment: Serial Number: [id number]:  [id number]       POC Glucose Once [[id number]]  (Abnormal) Collected: 10/18/20 2207    Specimen: Blood Updated: 10/18/20 2208     Glucose 136 mg/dL      Comment: Serial Number: [id number]:  [id number]       POC Glucose Once [[id number]]  (Abnormal) Collected: 10/18/20 1822    Specimen: Blood Updated: 10/18/20 1824     Glucose 149 mg/dL      Comment: Serial Number: [id number]:  [id number]       POC Glucose Once [[id number]]  (Abnormal) Collected: 10/18/20 1726    Specimen: Blood Updated: 10/18/20 1727     Glucose 157 mg/dL      Comment: Serial Number: [id number]:  [id number]           Imaging Results (Last 24 Hours)     ** No results found for the last 24 hours. **         I reviewed the patient's new clinical results.    Medication Review:    Current Facility-Administered Medications:   •  acetaminophen (TYLENOL) tablet 650 mg, 650 mg, Oral, Q4H PRN **OR** acetaminophen (TYLENOL) 160 MG/5ML solution 650 mg, 650 mg, Oral, Q4H PRN **OR** acetaminophen (TYLENOL) suppository 650 mg, 650 mg, Rectal, Q4H PRN, Kenyon Genao MD  •  azelastine (ASTELIN) nasal spray 2 spray, 2 spray, Each Nare, Daily, Jemma Bowling MD  •  dextrose (D50W) 25 g/ 50mL Intravenous Solution 25 g, 25 g, Intravenous, Q15 Min PRN, Kenyon Genao MD  •  dextrose (GLUTOSE) oral gel 15 g, 15 g, Oral, Q15 Min PRN, Kenyon Genao MD  •  glucagon (human recombinant) (GLUCAGEN DIAGNOSTIC) injection 1 mg, 1 mg, Subcutaneous, Q15 Min PRN, Kenyon Genao MD  •  insulin lispro (ADMELOG) injection 0-9 Units, 0-9 Units, Subcutaneous, TID AC  **AND** insulin lispro (ADMELOG) injection 0-9 Units, 0-9 Units, Subcutaneous, PRN, Kenyon Genao MD  •  melatonin tablet 5 mg, 5 mg, Oral, Nightly PRN, Kenyon Genao MD  •  Morphine sulfate (PF) injection 2 mg, 2 mg, Intravenous, Q4H PRN, Kenyon Genao MD, 2 mg at 10/19/20 1405  •  ondansetron (ZOFRAN) tablet 4 mg, 4 mg, Oral, Q6H PRN **OR** ondansetron (ZOFRAN) injection 4 mg, 4 mg, Intravenous, Q6H PRN, Kenyon Genao MD, 4 mg at 10/19/20 0945  •  oxyCODONE (ROXICODONE) immediate release tablet 5 mg, 5 mg, Oral, Q4H PRN, Kenyon Genao MD  •  piperacillin-tazobactam (ZOSYN) IVPB 3.375 g in 100 mL NS (CD), 3.375 g, Intravenous, Once, Jemma Bowling MD  •  piperacillin-tazobactam (ZOSYN) IVPB 3.375 g in 100 mL NS (CD), 3.375 g, Intravenous, Q8H, Jemma Bowling MD  •  promethazine (PHENERGAN) IVPB 12.5 mg, 12.5 mg, Intravenous, Q6H PRN, Lali Del Cid, APRN, 12.5 mg at 10/19/20 1405  •  sodium chloride 0.9 % flush 10 mL, 10 mL, Intravenous, Q12H, Kenyon Genao MD, 10 mL at 10/19/20 0911  •  sodium chloride 0.9 % flush 10 mL, 10 mL, Intravenous, PRN, Kenyon Genao MD  •  sodium chloride nasal spray 2 spray, 2 spray, Each Nare, PRN, Jemma Bowling MD    Assessment/Plan     Principal Problem:    Small bowel obstruction (CMS/HCC)  Active Problems:    Type 2 diabetes mellitus without complication, without long-term current use of insulin (CMS/HCC)    COPD (chronic obstructive pulmonary disease) (CMS/HCC)    Crohn's disease (CMS/HCC)    Parastomal hernia with obstruction and without gangrene      Postop day 1 exploratory laparotomy reduction of parastomal hernia ventral hernia repair and ileostomy.    Due to the bowel obstruction will continue nasogastric tube to suction until tomorrow.  Will need to increase her activity.  Okay to remove Johnson  Anticipate stopping antibiotics with resolution of leukocytosis  Okay to place on DVT chemoprophylaxis      This note was created using  [employer] Voice Recognition software.    Kenyon Genao MD  10/19/20  16:33 EDT

## 2020-10-19 NOTE — DISCHARGE PLACEMENT REQUEST
"Leonor Macias (70 y.o. Female)     Date of Birth Social Security Number Address Home Phone MRN    1950  [id number] [place] [address] [phone] [id number]    Taoism Marital Status          Unknown        Admission Date Admission Type Admitting Provider Attending Provider Department, Room/Bed    10/18/20 Urgent Jemma Bowling MD Kapadia, Shefali A, MD [facility] SURGICAL INPATIENT, 4124/1    Discharge Date Discharge Disposition Discharge Destination                       Attending Provider: Jemma Bowling MD    Allergies: Vancomycin, Sulfa Antibiotics    Isolation: None   Infection: None   Code Status: CPR    Ht: 177.8 cm (70\")   Wt: 76.2 kg (167 lb 15.9 oz)    Admission Cmt: None   Principal Problem: Small bowel obstruction (CMS/HCC) [K56.609]                 Active Insurance as of 10/18/2020     Primary Coverage     Payor Plan Insurance Group Employer/Plan Group    ANTHEM MEDICARE REPLACEMENT ANTHEM MEDICARE ADVANTAGE [id number]     Payor Plan Address Payor Plan Phone Number Payor Plan Fax Number Effective Dates    [address] [phone]  11/1/2019 - None Entered    [facility] [id number]       Subscriber Name Subscriber Birth Date Member ID       LEONOR MACIAS 1950 [id number]           Secondary Coverage     Payor Plan Insurance Group Employer/Plan Group    [place]      Payor Plan Address Payor Plan Phone Number Payor Plan Fax Number Effective Dates    [address]   10/1/2020 - None Entered    [place] [address]       Subscriber Name Subscriber Birth Date Member ID       LEONOR MACIAS 1950 [id number]                 Emergency Contacts      (Rel.) Home Phone Work Phone Mobile Phone    EDILBERTO MACIAS (Spouse) [phone] -- [phone]    JOSESITOTORRI (Son) [phone] -- [phone]              "

## 2020-10-20 LAB
ALBUMIN SERPL-MCNC: 3 G/DL (ref 3.5–5.2)
ALBUMIN/GLOB SERPL: 1.1 G/DL
ALP SERPL-CCNC: 75 U/L (ref 39–117)
ALT SERPL W P-5'-P-CCNC: 22 U/L (ref 1–33)
ANION GAP SERPL CALCULATED.3IONS-SCNC: 10 MMOL/L (ref 5–15)
AST SERPL-CCNC: 19 U/L (ref 1–32)
BASOPHILS # BLD AUTO: 0 10*3/MM3 (ref 0–0.2)
BASOPHILS NFR BLD AUTO: 0.4 % (ref 0–1.5)
BILIRUB SERPL-MCNC: 0.7 MG/DL (ref 0–1.2)
BUN SERPL-MCNC: 12 MG/DL (ref 8–23)
BUN/CREAT SERPL: 15 (ref 7–25)
CALCIUM SPEC-SCNC: 8.6 MG/DL (ref 8.6–10.5)
CHLORIDE SERPL-SCNC: 106 MMOL/L (ref 98–107)
CO2 SERPL-SCNC: 24 MMOL/L (ref 22–29)
CREAT SERPL-MCNC: 0.8 MG/DL (ref 0.57–1)
D-LACTATE SERPL-SCNC: 0.9 MMOL/L (ref 0.5–2)
DEPRECATED RDW RBC AUTO: 47.3 FL (ref 37–54)
EOSINOPHIL # BLD AUTO: 0.5 10*3/MM3 (ref 0–0.4)
EOSINOPHIL NFR BLD AUTO: 4.6 % (ref 0.3–6.2)
ERYTHROCYTE [DISTWIDTH] IN BLOOD BY AUTOMATED COUNT: 14.8 % (ref 12.3–15.4)
GFR SERPL CREATININE-BSD FRML MDRD: 71 ML/MIN/1.73
GLOBULIN UR ELPH-MCNC: 2.7 GM/DL
GLUCOSE BLDC GLUCOMTR-MCNC: 110 MG/DL (ref 70–105)
GLUCOSE BLDC GLUCOMTR-MCNC: 112 MG/DL (ref 70–105)
GLUCOSE BLDC GLUCOMTR-MCNC: 112 MG/DL (ref 70–105)
GLUCOSE BLDC GLUCOMTR-MCNC: 98 MG/DL (ref 70–105)
GLUCOSE SERPL-MCNC: 108 MG/DL (ref 65–99)
HCT VFR BLD AUTO: 37.4 % (ref 34–46.6)
HGB BLD-MCNC: 12.4 G/DL (ref 12–15.9)
LAB AP CASE REPORT: NORMAL
LYMPHOCYTES # BLD AUTO: 1.4 10*3/MM3 (ref 0.7–3.1)
LYMPHOCYTES NFR BLD AUTO: 13.9 % (ref 19.6–45.3)
MCH RBC QN AUTO: 30.3 PG (ref 26.6–33)
MCHC RBC AUTO-ENTMCNC: 33.1 G/DL (ref 31.5–35.7)
MCV RBC AUTO: 91.5 FL (ref 79–97)
MONOCYTES # BLD AUTO: 1.3 10*3/MM3 (ref 0.1–0.9)
MONOCYTES NFR BLD AUTO: 12.6 % (ref 5–12)
NEUTROPHILS NFR BLD AUTO: 68.5 % (ref 42.7–76)
NEUTROPHILS NFR BLD AUTO: 7 10*3/MM3 (ref 1.7–7)
NRBC BLD AUTO-RTO: 0 /100 WBC (ref 0–0.2)
PATH REPORT.FINAL DX SPEC: NORMAL
PATH REPORT.GROSS SPEC: NORMAL
PLATELET # BLD AUTO: 204 10*3/MM3 (ref 140–450)
PMV BLD AUTO: 8.7 FL (ref 6–12)
POTASSIUM SERPL-SCNC: 3.9 MMOL/L (ref 3.5–5.2)
PROT SERPL-MCNC: 5.7 G/DL (ref 6–8.5)
RBC # BLD AUTO: 4.09 10*6/MM3 (ref 3.77–5.28)
SODIUM SERPL-SCNC: 140 MMOL/L (ref 136–145)
WBC # BLD AUTO: 10.2 10*3/MM3 (ref 3.4–10.8)

## 2020-10-20 PROCEDURE — 87205 SMEAR GRAM STAIN: CPT | Performed by: INTERNAL MEDICINE

## 2020-10-20 PROCEDURE — 80053 COMPREHEN METABOLIC PANEL: CPT | Performed by: INTERNAL MEDICINE

## 2020-10-20 PROCEDURE — 25010000002 ENOXAPARIN PER 10 MG: Performed by: SURGERY

## 2020-10-20 PROCEDURE — 85025 COMPLETE CBC W/AUTO DIFF WBC: CPT | Performed by: INTERNAL MEDICINE

## 2020-10-20 PROCEDURE — 25010000002 ONDANSETRON PER 1 MG: Performed by: SURGERY

## 2020-10-20 PROCEDURE — 99024 POSTOP FOLLOW-UP VISIT: CPT | Performed by: SURGERY

## 2020-10-20 PROCEDURE — 25010000002 PROMETHAZINE PER 50 MG: Performed by: NURSE PRACTITIONER

## 2020-10-20 PROCEDURE — 82962 GLUCOSE BLOOD TEST: CPT

## 2020-10-20 PROCEDURE — 83605 ASSAY OF LACTIC ACID: CPT | Performed by: INTERNAL MEDICINE

## 2020-10-20 PROCEDURE — 25010000002 MORPHINE PER 10 MG: Performed by: SURGERY

## 2020-10-20 PROCEDURE — 99232 SBSQ HOSP IP/OBS MODERATE 35: CPT | Performed by: INTERNAL MEDICINE

## 2020-10-20 PROCEDURE — 87070 CULTURE OTHR SPECIMN AEROBIC: CPT | Performed by: INTERNAL MEDICINE

## 2020-10-20 PROCEDURE — 25010000002 PIPERACILLIN SOD-TAZOBACTAM PER 1 G: Performed by: INTERNAL MEDICINE

## 2020-10-20 RX ORDER — FUROSEMIDE 40 MG/1
40 TABLET ORAL 2 TIMES DAILY
COMMUNITY

## 2020-10-20 RX ORDER — COLCHICINE 0.6 MG/1
0.6 TABLET ORAL DAILY
COMMUNITY

## 2020-10-20 RX ADMIN — ENOXAPARIN SODIUM 40 MG: 40 INJECTION SUBCUTANEOUS at 17:10

## 2020-10-20 RX ADMIN — OXYCODONE 5 MG: 5 TABLET ORAL at 17:10

## 2020-10-20 RX ADMIN — SODIUM CHLORIDE 12.5 MG: 900 INJECTION, SOLUTION INTRAVENOUS at 04:58

## 2020-10-20 RX ADMIN — MORPHINE SULFATE 2 MG: 4 INJECTION INTRAVENOUS at 02:09

## 2020-10-20 RX ADMIN — OXYCODONE 5 MG: 5 TABLET ORAL at 12:57

## 2020-10-20 RX ADMIN — ONDANSETRON 4 MG: 2 INJECTION INTRAMUSCULAR; INTRAVENOUS at 20:15

## 2020-10-20 RX ADMIN — MORPHINE SULFATE 2 MG: 4 INJECTION INTRAVENOUS at 08:39

## 2020-10-20 RX ADMIN — PIPERACILLIN AND TAZOBACTAM 3.38 G: 3; .375 INJECTION, POWDER, LYOPHILIZED, FOR SOLUTION INTRAVENOUS at 00:52

## 2020-10-20 RX ADMIN — PIPERACILLIN AND TAZOBACTAM 3.38 G: 3; .375 INJECTION, POWDER, LYOPHILIZED, FOR SOLUTION INTRAVENOUS at 17:10

## 2020-10-20 RX ADMIN — AZELASTINE HYDROCHLORIDE 2 SPRAY: 137 SPRAY, METERED NASAL at 08:34

## 2020-10-20 RX ADMIN — PIPERACILLIN AND TAZOBACTAM 3.38 G: 3; .375 INJECTION, POWDER, LYOPHILIZED, FOR SOLUTION INTRAVENOUS at 08:34

## 2020-10-20 RX ADMIN — MORPHINE SULFATE 2 MG: 4 INJECTION INTRAVENOUS at 20:15

## 2020-10-20 RX ADMIN — ONDANSETRON 4 MG: 2 INJECTION INTRAMUSCULAR; INTRAVENOUS at 08:39

## 2020-10-20 NOTE — PROGRESS NOTES
Continued Stay Note  RAMON Wan     Patient Name: Leonor Macias  MRN: [id number]  Today's Date: 10/20/2020    Admit Date: 10/18/2020    Discharge Plan     Row Name 10/20/20 1213       Plan    Plan  Anticipate routine home with [facility], order placed and accepted. Need to fax d/c summary.    Plan Comments  Called and spoke to Yuni at [facility] and she informed patient has been accepted and will need to fax d/c summary when available. DC barriers:  IV abx, clear liquid diet          Sylvia Valero RN

## 2020-10-20 NOTE — PLAN OF CARE
Goal Outcome Evaluation:  Plan of Care Reviewed With: patient  Progress: improving     Patient moves self in bed. Refuses to get up d/t pain. Gastric gurgling heard on assessment, but output is scant amount of blood. Patient c/o nausea and pain frequently and asks for meds frequently. Will continue to monitor.

## 2020-10-20 NOTE — PROGRESS NOTES
General Surgery Progress Note     LOS: 2 days   Patient Care Team:  Emmanuel Guzman MD as PCP - General (Family Medicine)  Kenyon Genao MD as Consulting Physician (General Surgery)    Subjective     Interval History:   Mild to moderate improvement.  She says her pain is okay but it does hurt to get up and get around.  Mild nausea but no vomiting.  Her ileostomy started to function.  Nasogastric tube has been removed she has been on clear liquid diet and tolerating well.    Objective     Vital Signs  Temp:  [98.5 °F (36.9 °C)-99.7 °F (37.6 °C)] 98.6 °F (37 °C)  Heart Rate:  [] 102  Resp:  [16-20] 16  BP: (126-190)/(70-91) 159/76    Physical Exam  Constitutional:       Appearance: Normal appearance.   HENT:      Head: Normocephalic and atraumatic.   Cardiovascular:      Comments: Mild tachycardia  Pulmonary:      Effort: Pulmonary effort is normal. No respiratory distress.   Abdominal:      Comments: Soft appropriately tender ileostomy is pink and viable   Skin:     General: Skin is warm and dry.   Neurological:      General: No focal deficit present.      Mental Status: She is alert. Mental status is at baseline.   Psychiatric:         Mood and Affect: Mood normal.         Behavior: Behavior normal.            Results Review:    Lab Results (last 24 hours)     Procedure Component Value Units Date/Time    POC Glucose Once [[id number]]  (Abnormal) Collected: 10/20/20 1619    Specimen: Blood Updated: 10/20/20 1620     Glucose 112 mg/dL      Comment: Serial Number: [id number]:  [id number]       Tissue Pathology Exam [[id number]] Collected: 10/18/20 1507    Specimen: Tissue from Small Intestine Updated: 10/20/20 1539     Case Report --     Surgical Pathology Report                         Case: [id number]                                  Authorizing Provider:  Kenyon Genao MD        Collected:           10/18/2020 03:07 PM          Ordering Location:     [facility]  Received:             "10/19/2020 09:04 AM                                 OR                                                                           Pathologist:           Leonardo Pagan MD                                                           Specimen:    Small Intestine, SMALL BOWEL                                                                Final Diagnosis --     Small bowel with ostomy site, takedown excision:    Benign small intestine with reactive skin and ostomy site exhibiting chronic inflammation    Negative for dysplasia or malignancy     CLAIRE/tkd        Gross Description --     Received in a single container of formalin labeled \"Small bowel\" is a 11 cm length of small intestine with an average diameter of about 2.5 cm. There is an abundant amount of attached yellow-tan mesenteric fatty tissue. Both ends are closed with a line of surgical staples. Near one end there is what appears to be an ostomy with a possible thin rim of pink-tan skin. A representative section at this ostomy site is submitted in A. The remainder of the specimen is opened revealing unremarkable pink-tan mucosal folds of the small intestine. No mass lesions or ulcers are noted. Representative sections are submitted in B.     CLAIRE/sms        POC Glucose Once [[id number]]  (Abnormal) Collected: 10/20/20 1103    Specimen: Blood Updated: 10/20/20 1104     Glucose 110 mg/dL      Comment: Serial Number: [id number]:  [id number]       Blood Culture - Blood, Hand, Left [[id number]] Collected: 10/19/20 0952    Specimen: Blood from Hand, Left Updated: 10/20/20 1020     Blood Culture No growth at 24 hours    Blood Culture - Blood, Hand, Right [[id number]] Collected: 10/19/20 0952    Specimen: Blood from Hand, Right Updated: 10/20/20 1020     Blood Culture No growth at 24 hours    POC Glucose Once [[id number]]  (Abnormal) Collected: 10/20/20 0749    Specimen: Blood Updated: 10/20/20 0749     Glucose 112 mg/dL      Comment: Serial Number: [id number]:  " [id number]       Respiratory Culture - Sputum, Cough [[id number]] Collected: 10/20/20 0521    Specimen: Sputum from Cough Updated: 10/20/20 0634     Gram Stain Many (4+) WBCs per low power field      Rare (1+) Epithelial cells per low power field      Few (2+) Mixed bacterial morphotypes seen on Gram Stain    Comprehensive Metabolic Panel [[id number]]  (Abnormal) Collected: 10/20/20 0228    Specimen: Blood Updated: 10/20/20 0300     Glucose 108 mg/dL      BUN 12 mg/dL      Creatinine 0.80 mg/dL      Sodium 140 mmol/L      Potassium 3.9 mmol/L      Chloride 106 mmol/L      CO2 24.0 mmol/L      Calcium 8.6 mg/dL      Total Protein 5.7 g/dL      Albumin 3.00 g/dL      ALT (SGPT) 22 U/L      AST (SGOT) 19 U/L      Alkaline Phosphatase 75 U/L      Total Bilirubin 0.7 mg/dL      eGFR Non African Amer 71 mL/min/1.73      Globulin 2.7 gm/dL      A/G Ratio 1.1 g/dL      BUN/Creatinine Ratio 15.0     Anion Gap 10.0 mmol/L     Narrative:      GFR Normal >60  Chronic Kidney Disease <60  Kidney Failure <15      Lactic Acid, Plasma [[id number]]  (Normal) Collected: 10/20/20 0228    Specimen: Blood Updated: 10/20/20 0259     Lactate 0.9 mmol/L     CBC & Differential [[id number]]  (Abnormal) Collected: 10/20/20 0228    Specimen: Blood Updated: 10/20/20 0241    Narrative:      The following orders were created for panel order CBC & Differential.  Procedure                               Abnormality         Status                     ---------                               -----------         ------                     CBC Auto Differential[[id number]]        Abnormal            Final result                 Please view results for these tests on the individual orders.    CBC Auto Differential [[id number]]  (Abnormal) Collected: 10/20/20 0228    Specimen: Blood Updated: 10/20/20 0241     WBC 10.20 10*3/mm3      RBC 4.09 10*6/mm3      Hemoglobin 12.4 g/dL      Hematocrit 37.4 %      MCV 91.5 fL      MCH 30.3 pg      MCHC 33.1 g/dL      RDW 14.8 %       RDW-SD 47.3 fl      MPV 8.7 fL      Platelets 204 10*3/mm3      Neutrophil % 68.5 %      Lymphocyte % 13.9 %      Monocyte % 12.6 %      Eosinophil % 4.6 %      Basophil % 0.4 %      Neutrophils, Absolute 7.00 10*3/mm3      Lymphocytes, Absolute 1.40 10*3/mm3      Monocytes, Absolute 1.30 10*3/mm3      Eosinophils, Absolute 0.50 10*3/mm3      Basophils, Absolute 0.00 10*3/mm3      nRBC 0.0 /100 WBC     Urinalysis With Culture If Indicated - Urine, Random Void [[id number]]  (Abnormal) Collected: 10/19/20 1707    Specimen: Urine, Random Void Updated: 10/19/20 1734     Color, UA Yellow     Appearance, UA Clear     pH, UA 6.0     Specific Gravity, UA 1.018     Glucose, UA Negative     Ketones, UA 40 mg/dL (2+)     Bilirubin, UA Negative     Blood, UA Moderate (2+)     Protein, UA 30 mg/dL (1+)     Leuk Esterase, UA Negative     Nitrite, UA Negative     Urobilinogen, UA 0.2 E.U./dL    Urinalysis, Microscopic Only - Urine, Random Void [[id number]]  (Abnormal) Collected: 10/19/20 1707    Specimen: Urine, Random Void Updated: 10/19/20 1734     RBC, UA 13-20 /HPF      WBC, UA 0-2 /HPF      Bacteria, UA None Seen /HPF      Squamous Epithelial Cells, UA 0-2 /HPF      Hyaline Casts, UA 0-2 /LPF      Methodology Automated Microscopy    POC Glucose Once [[id number]]  (Abnormal) Collected: 10/19/20 1644    Specimen: Blood Updated: 10/19/20 1647     Glucose 106 mg/dL      Comment: Serial Number: [id number]:  [id number]           Imaging Results (Last 24 Hours)     ** No results found for the last 24 hours. **         I reviewed the patient's new clinical results.    Medication Review:    Current Facility-Administered Medications:   •  acetaminophen (TYLENOL) tablet 650 mg, 650 mg, Oral, Q4H PRN **OR** acetaminophen (TYLENOL) 160 MG/5ML solution 650 mg, 650 mg, Oral, Q4H PRN **OR** acetaminophen (TYLENOL) suppository 650 mg, 650 mg, Rectal, Q4H PRN, Kenyon Genao MD  •  azelastine (ASTELIN) nasal spray 2 spray, 2 spray,  Each Nare, Daily, Jemma Bowling MD, 2 spray at 10/20/20 0834  •  dextrose (D50W) 25 g/ 50mL Intravenous Solution 25 g, 25 g, Intravenous, Q15 Min PRN, Kenyon Genao MD  •  dextrose (GLUTOSE) oral gel 15 g, 15 g, Oral, Q15 Min PRN, Kenyon Genao MD  •  enoxaparin (LOVENOX) syringe 40 mg, 40 mg, Subcutaneous, Q24H, Kenyon Genao MD, 40 mg at 10/19/20 1709  •  glucagon (human recombinant) (GLUCAGEN DIAGNOSTIC) injection 1 mg, 1 mg, Subcutaneous, Q15 Min PRN, Kenyon Genao MD  •  insulin lispro (ADMELOG) injection 0-9 Units, 0-9 Units, Subcutaneous, TID AC **AND** insulin lispro (ADMELOG) injection 0-9 Units, 0-9 Units, Subcutaneous, PRN, Kenyon Genao MD  •  labetalol (NORMODYNE,TRANDATE) injection 20 mg, 20 mg, Intravenous, Q4H PRN, Jemma Bowling MD, 20 mg at 10/19/20 1800  •  melatonin tablet 5 mg, 5 mg, Oral, Nightly PRN, Kenyon Genao MD  •  Morphine sulfate (PF) injection 2 mg, 2 mg, Intravenous, Q4H PRN, Kenyon Genao MD, 2 mg at 10/20/20 0839  •  ondansetron (ZOFRAN) tablet 4 mg, 4 mg, Oral, Q6H PRN **OR** ondansetron (ZOFRAN) injection 4 mg, 4 mg, Intravenous, Q6H PRN, Kenyon Genao MD, 4 mg at 10/20/20 0839  •  oxyCODONE (ROXICODONE) immediate release tablet 5 mg, 5 mg, Oral, Q4H PRN, Kenyon Genao MD, 5 mg at 10/20/20 1257  •  piperacillin-tazobactam (ZOSYN) IVPB 3.375 g in 100 mL NS (CD), 3.375 g, Intravenous, Q8H, Jemma Bowling MD, 3.375 g at 10/20/20 0834  •  promethazine (PHENERGAN) IVPB 12.5 mg, 12.5 mg, Intravenous, Q6H PRN, Lali Del Cid, APRN, 12.5 mg at 10/20/20 0458    Assessment/Plan     Principal Problem:    Small bowel obstruction (CMS/HCC)  Active Problems:    Type 2 diabetes mellitus without complication, without long-term current use of insulin (CMS/HCC)    COPD (chronic obstructive pulmonary disease) (CMS/HCC)    Crohn's disease (CMS/HCC)    Parastomal hernia with obstruction and without gangrene      Postop day 2 exploratory laparotomy  reduction of parastomal hernia ventral hernia repair and ileostomy.    Low residue diet  DC Johnson  Anticipate stop antibiotics prior to discharge  On DVT chemoprophylaxis  Hopefully home in a day or 2      This note was created using Dragon Voice Recognition software.    Kenyon Genao MD  10/20/20  16:29 EDT

## 2020-10-20 NOTE — PLAN OF CARE
Goal Outcome Evaluation:  Plan of Care Reviewed With: patient  Progress: improving       Patient is stable. Patient still has complaints of pain. Urinary catheter and NG tube were removed. Continued with IV antibiotics. Patient is pleasant. Low Residue diet advanced. Will continue to monitor .

## 2020-10-21 VITALS
TEMPERATURE: 98 F | WEIGHT: 156.09 LBS | DIASTOLIC BLOOD PRESSURE: 69 MMHG | OXYGEN SATURATION: 94 % | RESPIRATION RATE: 12 BRPM | HEIGHT: 70 IN | BODY MASS INDEX: 22.35 KG/M2 | SYSTOLIC BLOOD PRESSURE: 139 MMHG | HEART RATE: 80 BPM

## 2020-10-21 LAB
ALBUMIN SERPL-MCNC: 3 G/DL (ref 3.5–5.2)
ALBUMIN/GLOB SERPL: 1.1 G/DL
ALP SERPL-CCNC: 87 U/L (ref 39–117)
ALT SERPL W P-5'-P-CCNC: 16 U/L (ref 1–33)
ANION GAP SERPL CALCULATED.3IONS-SCNC: 9 MMOL/L (ref 5–15)
AST SERPL-CCNC: 17 U/L (ref 1–32)
BASOPHILS # BLD AUTO: 0.1 10*3/MM3 (ref 0–0.2)
BASOPHILS NFR BLD AUTO: 0.8 % (ref 0–1.5)
BILIRUB SERPL-MCNC: 0.6 MG/DL (ref 0–1.2)
BUN SERPL-MCNC: 15 MG/DL (ref 8–23)
BUN/CREAT SERPL: 20.5 (ref 7–25)
CALCIUM SPEC-SCNC: 8.6 MG/DL (ref 8.6–10.5)
CHLORIDE SERPL-SCNC: 105 MMOL/L (ref 98–107)
CO2 SERPL-SCNC: 26 MMOL/L (ref 22–29)
CREAT SERPL-MCNC: 0.73 MG/DL (ref 0.57–1)
DEPRECATED RDW RBC AUTO: 45.5 FL (ref 37–54)
EOSINOPHIL # BLD AUTO: 0.8 10*3/MM3 (ref 0–0.4)
EOSINOPHIL NFR BLD AUTO: 10.1 % (ref 0.3–6.2)
ERYTHROCYTE [DISTWIDTH] IN BLOOD BY AUTOMATED COUNT: 14.2 % (ref 12.3–15.4)
GFR SERPL CREATININE-BSD FRML MDRD: 79 ML/MIN/1.73
GLOBULIN UR ELPH-MCNC: 2.7 GM/DL
GLUCOSE BLDC GLUCOMTR-MCNC: 101 MG/DL (ref 70–105)
GLUCOSE BLDC GLUCOMTR-MCNC: 121 MG/DL (ref 70–105)
GLUCOSE SERPL-MCNC: 116 MG/DL (ref 65–99)
HCT VFR BLD AUTO: 36.2 % (ref 34–46.6)
HGB BLD-MCNC: 11.9 G/DL (ref 12–15.9)
LYMPHOCYTES # BLD AUTO: 1.6 10*3/MM3 (ref 0.7–3.1)
LYMPHOCYTES NFR BLD AUTO: 19.4 % (ref 19.6–45.3)
MCH RBC QN AUTO: 30 PG (ref 26.6–33)
MCHC RBC AUTO-ENTMCNC: 33 G/DL (ref 31.5–35.7)
MCV RBC AUTO: 91.1 FL (ref 79–97)
MONOCYTES # BLD AUTO: 0.9 10*3/MM3 (ref 0.1–0.9)
MONOCYTES NFR BLD AUTO: 11.2 % (ref 5–12)
NEUTROPHILS NFR BLD AUTO: 4.8 10*3/MM3 (ref 1.7–7)
NEUTROPHILS NFR BLD AUTO: 58.5 % (ref 42.7–76)
NRBC BLD AUTO-RTO: 0 /100 WBC (ref 0–0.2)
PLATELET # BLD AUTO: 233 10*3/MM3 (ref 140–450)
PMV BLD AUTO: 8.9 FL (ref 6–12)
POTASSIUM SERPL-SCNC: 3.7 MMOL/L (ref 3.5–5.2)
PROT SERPL-MCNC: 5.7 G/DL (ref 6–8.5)
RBC # BLD AUTO: 3.97 10*6/MM3 (ref 3.77–5.28)
SODIUM SERPL-SCNC: 140 MMOL/L (ref 136–145)
WBC # BLD AUTO: 8.3 10*3/MM3 (ref 3.4–10.8)

## 2020-10-21 PROCEDURE — 80053 COMPREHEN METABOLIC PANEL: CPT | Performed by: INTERNAL MEDICINE

## 2020-10-21 PROCEDURE — 99024 POSTOP FOLLOW-UP VISIT: CPT | Performed by: SURGERY

## 2020-10-21 PROCEDURE — 99239 HOSP IP/OBS DSCHRG MGMT >30: CPT | Performed by: INTERNAL MEDICINE

## 2020-10-21 PROCEDURE — 63710000001 ONDANSETRON PER 8 MG: Performed by: SURGERY

## 2020-10-21 PROCEDURE — 25010000002 MORPHINE PER 10 MG: Performed by: SURGERY

## 2020-10-21 PROCEDURE — 85025 COMPLETE CBC W/AUTO DIFF WBC: CPT | Performed by: INTERNAL MEDICINE

## 2020-10-21 PROCEDURE — 82962 GLUCOSE BLOOD TEST: CPT

## 2020-10-21 PROCEDURE — 25010000002 PIPERACILLIN SOD-TAZOBACTAM PER 1 G: Performed by: INTERNAL MEDICINE

## 2020-10-21 RX ORDER — COLCHICINE 0.6 MG/1
0.6 TABLET ORAL DAILY
Status: DISCONTINUED | OUTPATIENT
Start: 2020-10-21 | End: 2020-10-21 | Stop reason: HOSPADM

## 2020-10-21 RX ORDER — METOPROLOL TARTRATE 50 MG/1
50 TABLET, FILM COATED ORAL 2 TIMES DAILY
COMMUNITY

## 2020-10-21 RX ORDER — FUROSEMIDE 40 MG/1
40 TABLET ORAL 2 TIMES DAILY
Status: DISCONTINUED | OUTPATIENT
Start: 2020-10-21 | End: 2020-10-21 | Stop reason: HOSPADM

## 2020-10-21 RX ORDER — OXYCODONE HYDROCHLORIDE 5 MG/1
5 TABLET ORAL EVERY 6 HOURS PRN
Qty: 20 TABLET | Refills: 0 | Status: SHIPPED | OUTPATIENT
Start: 2020-10-21 | End: 2020-10-28

## 2020-10-21 RX ORDER — AZELASTINE 1 MG/ML
2 SPRAY, METERED NASAL 2 TIMES DAILY
Qty: 30 ML | Refills: 1 | Status: SHIPPED | OUTPATIENT
Start: 2020-10-21 | End: 2021-04-15

## 2020-10-21 RX ADMIN — FUROSEMIDE 40 MG: 40 TABLET ORAL at 11:41

## 2020-10-21 RX ADMIN — PIPERACILLIN AND TAZOBACTAM 3.38 G: 3; .375 INJECTION, POWDER, LYOPHILIZED, FOR SOLUTION INTRAVENOUS at 08:59

## 2020-10-21 RX ADMIN — OXYCODONE 5 MG: 5 TABLET ORAL at 02:03

## 2020-10-21 RX ADMIN — PIPERACILLIN AND TAZOBACTAM 3.38 G: 3; .375 INJECTION, POWDER, LYOPHILIZED, FOR SOLUTION INTRAVENOUS at 00:43

## 2020-10-21 RX ADMIN — MORPHINE SULFATE 2 MG: 4 INJECTION INTRAVENOUS at 03:20

## 2020-10-21 RX ADMIN — OXYCODONE 5 MG: 5 TABLET ORAL at 13:29

## 2020-10-21 RX ADMIN — AZELASTINE HYDROCHLORIDE 2 SPRAY: 137 SPRAY, METERED NASAL at 08:59

## 2020-10-21 RX ADMIN — OXYCODONE 5 MG: 5 TABLET ORAL at 09:05

## 2020-10-21 RX ADMIN — COLCHICINE 0.6 MG: 0.6 TABLET, FILM COATED ORAL at 11:41

## 2020-10-21 RX ADMIN — ONDANSETRON HYDROCHLORIDE 4 MG: 4 TABLET, FILM COATED ORAL at 03:29

## 2020-10-21 NOTE — DISCHARGE SUMMARY
[facility] Medicine Services  DISCHARGE SUMMARY        Prepared For PCP:  Emmanuel Guzman MD    Patient Name: Leonor Macias  : 1950  MRN: [id number]      Date of Admission:   10/18/2020    Date of Discharge:  10/21/2020    Length of stay:  LOS: 3 days     Hospital Course     Presenting Problem:   Small bowel obstruction (CMS/HCC) [K56.609]      Active Hospital Problems    Diagnosis  POA   • **Small bowel obstruction (CMS/HCC) [K56.609]  Yes   • COPD (chronic obstructive pulmonary disease) (CMS/HCC) [J44.9]  Yes   • Crohn's disease (CMS/HCC) [K50.90]  Yes   • Parastomal hernia with obstruction and without gangrene [K43.3]  Yes   • Type 2 diabetes mellitus without complication, without long-term current use of insulin (CMS/HCC) [E11.9]  Yes      Resolved Hospital Problems   No resolved problems to display.           Hospital Course:  Leonor Macias is a 70 y.o. female with a history of ulcerative colitis/Crohn's disease, diabetes type 2, GERD, hyperlipidemia, hypertension, extensive abdominal surgeries presented to an outlying facility ([facility] in [facility]) for a sudden onset of acute abdominal pain, nausea, and vomiting.  Patient has an ileostomy and states that she has not had output since about 8 PM on 10/17/2020.  Patient states her pain is a 7 out of 10, intermittent, sharp, cramping, relieved with pain medication to a 3 out of 10.  Patient states she has had a ileostomy since  where she had a bowel resection with a an ileostomy placed.  Patient states she is also had multiple hernia repairs.  Vital signs: 141/61, temp 97.6, heart rate 80, respiratory rate 16, O2 saturation 96%  Patient denies fever, chills, cough, congestion, chest pain, or ill contacts.  Patient was transferred to [facility] for further management and evaluation.     Labs: Lactic acid 1.3, sodium 139, potassium 3.6, BUN 18, creatinine 1.21, glucose 114, ALT 26, AST 38, alkaline  phosphatase 127, WBC 8.0, Hgb 15.1, HCT 46.0, platelets 242.     CT scan abdomen pelvis with IV contrast: Mild distal partial small bowel obstruction with transition site seen with enlarged parastomal hernia along the right ileostomy.  Mild ascites, small cystocele.  The patient had a negative COVID-19 test prior to surgery.    The patient was evaluated by general surgery for her parastomal hernia which had incarceration and bowel obstruction.  An NG tube was placed to decompress the stomach and relieve her nausea and vomiting.  She was taken for an exploratory laparotomy with pylorus stomal hernia repair on 10/18/2020.  Review of the op note reveals there is no evidence of gangrenous or dead bowel.  The surgeon performed lysis of adhesions, reduction of incarcerated parastomal hernia, small bowel resection, and ileostomy, and ventral hernia repair during the procedure.      The patient's postoperative course has been fairly unremarkable despite having a postop fever of 101.1 on postop day #1.  Patient was having some cough with yellow sputum and some minor blood tingeing due to some postnasal drainage that had occurred likely in relation to the NG tube that had been placed prior to surgery.  She was treated with saline and antihistamine nasal sprays to help clear and reduce the amount of drainage.  And she was placed on empiric Zosyn temporarily to cover for any deeper infections that may have been present.  Once the NG tube was removed on postop day 2, her drainage cleared up significantly.  She had no recurrence of fever after the initial temperature spike and no other signs or symptoms of active infection.  Blood cultures and urinalysis also showed no evidence for other systemic infection.    She is able to tolerate a diet and is restricted at this time to a low residue diet per the surgeon's recommendation until she can be seen in follow-up in the surgeon's office in 2 weeks.  She is discharged home in good  condition with instructions for follow-up as noted below.      Recommendation for Outpatient Providers:     Patient's home medication list was not available initially during the admission.  She will be restarting her colchicine, metoprolol, furosemide, and Metformin at home after discharge.  I have instructed the patient to monitor her blood pressure and blood sugars and contact her PCP if any abnormalities or problems occur.      Day of Discharge     HPI: Patient states no new complaints.  Her sinus drainage has cleared.  She denies any significant nausea or vomiting today and was able to tolerate her cream of wheat for breakfast.      Vital Signs:   Temp:  [98 °F (36.7 °C)-98.3 °F (36.8 °C)] 98 °F (36.7 °C)  Heart Rate:  [] 80  Resp:  [12-26] 12  BP: (139-177)/(65-77) 139/69     Physical Exam:  General: well-developed and well-nourished, NAD  HEENT: NC/AT, EOMI, PERRLA, no sinus tenderness to palpation  Heart: RRR. No murmur   Chest: CTAB, no w/r/r, normal respiratory effort  Abdominal: Soft. ND. Bowel sounds present.  Ostomy site noninflamed.  Incisions clean/dry/intact.  Musculoskeletal: Normal ROM.  No edema. No calf tenderness.  Neurological: AAOx3, no focal deficits  Skin: Skin is warm and dry. No rash  Psychiatric: Normal mood and affect.    Pertinent  and/or Most Recent Results     Results from last 7 days   Lab Units 10/21/20  0532 10/20/20  0228 10/19/20  0952 10/18/20  0711   WBC 10*3/mm3 8.30 10.20 11.50* 8.70   HEMOGLOBIN g/dL 11.9* 12.4 13.4 14.2   HEMATOCRIT % 36.2 37.4 41.1 42.7   PLATELETS 10*3/mm3 233 204 211 220   SODIUM mmol/L 140 140 143 140   POTASSIUM mmol/L 3.7 3.9 4.1 4.1   CHLORIDE mmol/L 105 106 108* 102   CO2 mmol/L 26.0 24.0 24.0 26.0   BUN mg/dL 15 12 11 12   CREATININE mg/dL 0.73 0.80 0.73 0.87   GLUCOSE mg/dL 116* 108* 147* 140*   CALCIUM mg/dL 8.6 8.6 9.2 9.5     Results from last 7 days   Lab Units 10/21/20  0532 10/20/20  0228 10/19/20  0952 10/18/20  0711   BILIRUBIN mg/dL  0.6 0.7 0.7 0.5   ALK PHOS U/L 87 75 89 113   ALT (SGPT) U/L 16 22 30 24   AST (SGOT) U/L 17 19 33* 26   PROTIME Seconds  --   --   --  11.0   INR   --   --   --  1.00   APTT seconds  --   --   --  24.9           Invalid input(s): TG, LDLCALC, LDLREALC  Results from last 7 days   Lab Units 10/20/20  0228   LACTATE mmol/L 0.9       Brief Urine Lab Results  (Last result in the past 365 days)      Color   Clarity   Blood   Leuk Est   Nitrite   Protein   CREAT   Urine HCG        10/19/20 1707 Yellow Clear Moderate (2+) Negative Negative 30 mg/dL (1+)               Microbiology Results Abnormal     Procedure Component Value - Date/Time    Blood Culture - Blood, Hand, Left [[id number]] Collected: 10/19/20 0952    Lab Status: Preliminary result Specimen: Blood from Hand, Left Updated: 10/21/20 1015     Blood Culture No growth at 2 days    Blood Culture - Blood, Hand, Right [[id number]] Collected: 10/19/20 0952    Lab Status: Preliminary result Specimen: Blood from Hand, Right Updated: 10/21/20 1015     Blood Culture No growth at 2 days    Respiratory Culture - Sputum, Cough [[id number]] Collected: 10/20/20 0521    Lab Status: Preliminary result Specimen: Sputum from Cough Updated: 10/21/20 0803     Respiratory Culture Moderate growth (3+) Normal Respiratory Carole: NO S.aureus/MRSA or Pseudomonas aeruginosa     Gram Stain Many (4+) WBCs per low power field      Rare (1+) Epithelial cells per low power field      Few (2+) Mixed bacterial morphotypes seen on Gram Stain    COVID PRE-OP / PRE-PROCEDURE SCREENING ORDER (NO ISOLATION) - Swab, Nasopharynx [[id number]]  (Normal) Collected: 10/18/20 1249    Lab Status: Final result Specimen: Swab from Nasopharynx Updated: 10/18/20 1405    Narrative:      The following orders were created for panel order COVID PRE-OP / PRE-PROCEDURE SCREENING ORDER (NO ISOLATION) - Swab, Nasopharynx.  Procedure                               Abnormality         Status                     ---------                                -----------         ------                     COVID-19,CEPHEID,COR/TRANG...[[id number]]  Normal              Final result                 Please view results for these tests on the individual orders.    COVID-19,CEPHEID,COR/TRANG/PAD IN-HOUSE(OR EMERGENT/ADD-ON),NP SWAB IN TRANSPORT MEDIA 3-4 HR TAT - Swab, Nasopharynx [[id number]]  (Normal) Collected: 10/18/20 1249    Lab Status: Final result Specimen: Swab from Nasopharynx Updated: 10/18/20 1403     COVID19 Not Detected    Narrative:      Fact sheet for providers: https://www.fda.gov/media/776357/download     Fact sheet for patients: https://www.fda.gov/media/803237/download          Test Results Pending at Discharge  Pending Labs     Order Current Status    Blood Culture - Blood, Hand, Left Preliminary result    Blood Culture - Blood, Hand, Right Preliminary result    Respiratory Culture - Sputum, Cough Preliminary result            Procedures Performed  Procedure(s):  LAPAROTOMY EXPLORATOR AND END ILEOSTOMY  VENTRAL/INCISIONAL HERNIA REPAIR  COLON RESECTION SMALL BOWEL         Consults:   Consults     Date and Time Order Name Status Description    10/18/2020 0624 Inpatient General Surgery Consult Completed             Discharge Details        Discharge Medications      New Medications      Instructions Start Date   azelastine 0.1 % nasal spray  Commonly known as: ASTELIN   2 sprays, Nasal, 2 Times Daily, Use in each nostril as directed      oxyCODONE 5 MG immediate release tablet  Commonly known as: ROXICODONE   5 mg, Oral, Every 6 Hours PRN         Continue These Medications      Instructions Start Date   colchicine 0.6 MG tablet   0.6 mg, Oral, Daily, Pt stated she can take up to 3 times/day if gout acting up       furosemide 40 MG tablet  Commonly known as: LASIX   40 mg, Oral, 2 Times Daily      metFORMIN 500 MG tablet  Commonly known as: GLUCOPHAGE   500 mg, Oral, Daily With Breakfast      metoprolol tartrate 50 MG tablet  Commonly known  as: LOPRESSOR   50 mg, Oral, 2 times daily             Allergies   Allergen Reactions   • Vancomycin Unknown - High Severity   • Sulfa Antibiotics Unknown - Low Severity         Discharge Disposition:      Diet:  Hospital:  Diet Order   Procedures   • Diet Gastrointestinal; Low Residue         Discharge Activity:   Activity Instructions     Driving Restrictions      Type of Restriction: Driving    Driving Restrictions: No Driving Until Next Appointment    Lifting Restrictions      Type of Restriction: Lifting    Lifting Restrictions: Other    Explain Lifing Restrictions: No lifting more than 15 lbs for 6 weeks. Or otherwise specified the day of surgery.    Other Activity Instructions      Activity Instructions: May shower in 24 hours. Do not tub soak incision or swim for at least 2 weeks.            CODE STATUS:    Code Status and Medical Interventions:   Ordered at: 10/18/20 0601     Code Status:    CPR     Medical Interventions (Level of Support Prior to Arrest):    Full         Follow-up Appointments  Future Appointments   Date Time Provider Department Center   11/3/2020  9:20 AM Kenyon Genao MD [facility] None       Additional Instructions for the Follow-ups that You Need to Schedule     Ambulatory Referral to Home Health   As directed      Face to Face Visit Date: 10/19/2020    Follow-up provider for Plan of Care?: I treated the patient in an acute care facility and will not continue treatment after discharge.    Follow-up provider: IKE BARILLAS [[id number]]    Reason/Clinical Findings: s/p ileostomy    Describe mobility limitations that make leaving home difficult: s/p ileostomy    Nursing/Therapeutic Services Requested: Other (eval and treat)    Frequency: 1 Week 1         Call MD With Problems / Concerns   As directed      Call the office, [phone] with any questions or concerns following your surgery.    Order Comments: Call the office, [phone] with any questions or concerns following your surgery.           Call MD With Problems / Concerns   As directed      Instructions: Call [phone] or email [email or web address] for problems or concerns.    Order Comments: Instructions: Call [phone] or email [email or web address] for problems or concerns.          Discharge Follow-up with PCP   As directed       Currently Documented PCP:    Emmanuel Guzman MD    PCP Phone Number:    [phone]     Follow Up Details: 1-2 weeks         Discharge Follow-up with Specified Provider: Dr. Genao, General Surgery. Call [phone] to schedule; 2 Weeks   As directed      To: Dr. Genao, General Surgery. Call [phone] to schedule    Follow Up: 2 Weeks                 Condition on Discharge:      Stable      This patient has been examined wearing appropriate Personal Protective Equipment. 10/21/20      Electronically signed by Jemma Bowling MD, 10/21/20, 12:17 PM EDT.      Time: I spent  40  minutes on this discharge activity which included face-to-face encounter with the patient/reviewing the data in the system/coordination of the care with the nursing staff as well as consultants/documentation/entering orders.

## 2020-10-21 NOTE — DISCHARGE PLACEMENT REQUEST
"Leonor Macias (70 y.o. Female)     Date of Birth Social Security Number Address Home Phone MRN    1950  [address] [place] [address] [phone] [id number]    Confucianist Marital Status          Unknown        Admission Date Admission Type Admitting Provider Attending Provider Department, Room/Bed    10/18/20 Urgent Jemma Bowling MD Kapadia, Shefali A, MD [facility] SURGICAL INPATIENT, 4124/1    Discharge Date Discharge Disposition Discharge Destination         Home or Self Care              Attending Provider: Jemma Bowling MD    Allergies: Vancomycin, Sulfa Antibiotics    Isolation: None   Infection: None   Code Status: CPR    Ht: 177.8 cm (70\")   Wt: 70.8 kg (156 lb 1.4 oz)    Admission Cmt: None   Principal Problem: Small bowel obstruction (CMS/HCC) [K56.609]                 Active Insurance as of 10/18/2020     Primary Coverage     Payor Plan Insurance Group Employer/Plan Group    ANTHEM MEDICARE REPLACEMENT ANTHEM MEDICARE ADVANTAGE [id number]     Payor Plan Address Payor Plan Phone Number Payor Plan Fax Number Effective Dates    [address] [phone]  11/1/2019 - None Entered    [facility] [id number]       Subscriber Name Subscriber Birth Date Member ID       LEONOR MACIAS 1950 [id number]           Secondary Coverage     Payor Plan Insurance Group Employer/Plan Group    [place]      Payor Plan Address Payor Plan Phone Number Payor Plan Fax Number Effective Dates    [address]   10/1/2020 - None Entered    [facility] [id number]       Subscriber Name Subscriber Birth Date Member ID       LEONOR MACIAS 1950 [id number]                 Emergency Contacts      (Rel.) Home Phone Work Phone Mobile Phone    EDILBERTO MACIAS (Spouse) [phone] -- [phone]    TORRI MACIAS (Son) [phone] -- [phone]               Discharge Summary      Jemma Bowling MD at 10/21/20 1217      "           [facility]  DISCHARGE SUMMARY        Prepared For PCP:  Emmanuel Guzman MD    Patient Name: Leonor Macias  : 1950  MRN: [id number]      Date of Admission:   10/18/2020    Date of Discharge:  10/21/2020    Length of stay:  LOS: 3 days     Hospital Course     Presenting Problem:   Small bowel obstruction (CMS/HCC) [K56.609]      Active Hospital Problems    Diagnosis  POA   • **Small bowel obstruction (CMS/HCC) [K56.609]  Yes   • COPD (chronic obstructive pulmonary disease) (CMS/HCC) [J44.9]  Yes   • Crohn's disease (CMS/HCC) [K50.90]  Yes   • Parastomal hernia with obstruction and without gangrene [K43.3]  Yes   • Type 2 diabetes mellitus without complication, without long-term current use of insulin (CMS/HCC) [E11.9]  Yes      Resolved Hospital Problems   No resolved problems to display.           Hospital Course:  Leonor Macias is a 70 y.o. female with a history of ulcerative colitis/Crohn's disease, diabetes type 2, GERD, hyperlipidemia, hypertension, extensive abdominal surgeries presented to an outlying facility ([facility] in [facility]) for a sudden onset of acute abdominal pain, nausea, and vomiting.  Patient has an ileostomy and states that she has not had output since about 8 PM on 10/17/2020.  Patient states her pain is a 7 out of 10, intermittent, sharp, cramping, relieved with pain medication to a 3 out of 10.  Patient states she has had a ileostomy since  where she had a bowel resection with a an ileostomy placed.  Patient states she is also had multiple hernia repairs.  Vital signs: 141/61, temp 97.6, heart rate 80, respiratory rate 16, O2 saturation 96%  Patient denies fever, chills, cough, congestion, chest pain, or ill contacts.  Patient was transferred to [facility] for further management and evaluation.     Labs: Lactic acid 1.3, sodium 139, potassium 3.6, BUN 18, creatinine 1.21, glucose 114, ALT 26, AST 38,  alkaline phosphatase 127, WBC 8.0, Hgb 15.1, HCT 46.0, platelets 242.     CT scan abdomen pelvis with IV contrast: Mild distal partial small bowel obstruction with transition site seen with enlarged parastomal hernia along the right ileostomy.  Mild ascites, small cystocele.  The patient had a negative COVID-19 test prior to surgery.    The patient was evaluated by general surgery for her parastomal hernia which had incarceration and bowel obstruction.  An NG tube was placed to decompress the stomach and relieve her nausea and vomiting.  She was taken for an exploratory laparotomy with pylorus stomal hernia repair on 10/18/2020.  Review of the op note reveals there is no evidence of gangrenous or dead bowel.  The surgeon performed lysis of adhesions, reduction of incarcerated parastomal hernia, small bowel resection, and ileostomy, and ventral hernia repair during the procedure.      The patient's postoperative course has been fairly unremarkable despite having a postop fever of 101.1 on postop day #1.  Patient was having some cough with yellow sputum and some minor blood tingeing due to some postnasal drainage that had occurred likely in relation to the NG tube that had been placed prior to surgery.  She was treated with saline and antihistamine nasal sprays to help clear and reduce the amount of drainage.  And she was placed on empiric Zosyn temporarily to cover for any deeper infections that may have been present.  Once the NG tube was removed on postop day 2, her drainage cleared up significantly.  She had no recurrence of fever after the initial temperature spike and no other signs or symptoms of active infection.  Blood cultures and urinalysis also showed no evidence for other systemic infection.    She is able to tolerate a diet and is restricted at this time to a low residue diet per the surgeon's recommendation until she can be seen in follow-up in the surgeon's office in 2 weeks.  She is discharged home in  good condition with instructions for follow-up as noted below.      Recommendation for Outpatient Providers:     Patient's home medication list was not available initially during the admission.  She will be restarting her colchicine, metoprolol, furosemide, and Metformin at home after discharge.  I have instructed the patient to monitor her blood pressure and blood sugars and contact her PCP if any abnormalities or problems occur.      Day of Discharge     HPI: Patient states no new complaints.  Her sinus drainage has cleared.  She denies any significant nausea or vomiting today and was able to tolerate her cream of wheat for breakfast.      Vital Signs:   Temp:  [98 °F (36.7 °C)-98.3 °F (36.8 °C)] 98 °F (36.7 °C)  Heart Rate:  [] 80  Resp:  [12-26] 12  BP: (139-177)/(65-77) 139/69     Physical Exam:  General: well-developed and well-nourished, NAD  HEENT: NC/AT, EOMI, PERRLA, no sinus tenderness to palpation  Heart: RRR. No murmur   Chest: CTAB, no w/r/r, normal respiratory effort  Abdominal: Soft. ND. Bowel sounds present.  Ostomy site noninflamed.  Incisions clean/dry/intact.  Musculoskeletal: Normal ROM.  No edema. No calf tenderness.  Neurological: AAOx3, no focal deficits  Skin: Skin is warm and dry. No rash  Psychiatric: Normal mood and affect.    Pertinent  and/or Most Recent Results     Results from last 7 days   Lab Units 10/21/20  0532 10/20/20  0228 10/19/20  0952 10/18/20  0711   WBC 10*3/mm3 8.30 10.20 11.50* 8.70   HEMOGLOBIN g/dL 11.9* 12.4 13.4 14.2   HEMATOCRIT % 36.2 37.4 41.1 42.7   PLATELETS 10*3/mm3 233 204 211 220   SODIUM mmol/L 140 140 143 140   POTASSIUM mmol/L 3.7 3.9 4.1 4.1   CHLORIDE mmol/L 105 106 108* 102   CO2 mmol/L 26.0 24.0 24.0 26.0   BUN mg/dL 15 12 11 12   CREATININE mg/dL 0.73 0.80 0.73 0.87   GLUCOSE mg/dL 116* 108* 147* 140*   CALCIUM mg/dL 8.6 8.6 9.2 9.5     Results from last 7 days   Lab Units 10/21/20  0532 10/20/20  0228 10/19/20  0952 10/18/20  0711   BILIRUBIN  mg/dL 0.6 0.7 0.7 0.5   ALK PHOS U/L 87 75 89 113   ALT (SGPT) U/L 16 22 30 24   AST (SGOT) U/L 17 19 33* 26   PROTIME Seconds  --   --   --  11.0   INR   --   --   --  1.00   APTT seconds  --   --   --  24.9           Invalid input(s): TG, LDLCALC, LDLREALC  Results from last 7 days   Lab Units 10/20/20  0228   LACTATE mmol/L 0.9       Brief Urine Lab Results  (Last result in the past 365 days)      Color   Clarity   Blood   Leuk Est   Nitrite   Protein   CREAT   Urine HCG        10/19/20 1707 Yellow Clear Moderate (2+) Negative Negative 30 mg/dL (1+)               Microbiology Results Abnormal     Procedure Component Value - Date/Time    Blood Culture - Blood, Hand, Left [[id number]] Collected: 10/19/20 0952    Lab Status: Preliminary result Specimen: Blood from Hand, Left Updated: 10/21/20 1015     Blood Culture No growth at 2 days    Blood Culture - Blood, Hand, Right [[id number]] Collected: 10/19/20 0952    Lab Status: Preliminary result Specimen: Blood from Hand, Right Updated: 10/21/20 1015     Blood Culture No growth at 2 days    Respiratory Culture - Sputum, Cough [[id number]] Collected: 10/20/20 0521    Lab Status: Preliminary result Specimen: Sputum from Cough Updated: 10/21/20 0803     Respiratory Culture Moderate growth (3+) Normal Respiratory Carole: NO S.aureus/MRSA or Pseudomonas aeruginosa     Gram Stain Many (4+) WBCs per low power field      Rare (1+) Epithelial cells per low power field      Few (2+) Mixed bacterial morphotypes seen on Gram Stain    COVID PRE-OP / PRE-PROCEDURE SCREENING ORDER (NO ISOLATION) - Swab, Nasopharynx [[id number]]  (Normal) Collected: 10/18/20 1249    Lab Status: Final result Specimen: Swab from Nasopharynx Updated: 10/18/20 1408    Narrative:      The following orders were created for panel order COVID PRE-OP / PRE-PROCEDURE SCREENING ORDER (NO ISOLATION) - Swab, Nasopharynx.  Procedure                               Abnormality         Status                     ---------                                -----------         ------                     COVID-19,CEPHEID,COR/TRANG...[[id number]]  Normal              Final result                 Please view results for these tests on the individual orders.    COVID-19,CEPHEID,COR/TRANG/PAD IN-HOUSE(OR EMERGENT/ADD-ON),NP SWAB IN TRANSPORT MEDIA 3-4 HR TAT - Swab, Nasopharynx [[id number]]  (Normal) Collected: 10/18/20 1249    Lab Status: Final result Specimen: Swab from Nasopharynx Updated: 10/18/20 1403     COVID19 Not Detected    Narrative:      Fact sheet for providers: https://www.fda.gov/media/309740/download     Fact sheet for patients: https://www.fda.gov/media/724078/download          Test Results Pending at Discharge  Pending Labs     Order Current Status    Blood Culture - Blood, Hand, Left Preliminary result    Blood Culture - Blood, Hand, Right Preliminary result    Respiratory Culture - Sputum, Cough Preliminary result            Procedures Performed  Procedure(s):  LAPAROTOMY EXPLORATOR AND END ILEOSTOMY  VENTRAL/INCISIONAL HERNIA REPAIR  COLON RESECTION SMALL BOWEL         Consults:   Consults     Date and Time Order Name Status Description    10/18/2020 0624 Inpatient General Surgery Consult Completed             Discharge Details        Discharge Medications      New Medications      Instructions Start Date   azelastine 0.1 % nasal spray  Commonly known as: ASTELIN   2 sprays, Nasal, 2 Times Daily, Use in each nostril as directed      oxyCODONE 5 MG immediate release tablet  Commonly known as: ROXICODONE   5 mg, Oral, Every 6 Hours PRN         Continue These Medications      Instructions Start Date   colchicine 0.6 MG tablet   0.6 mg, Oral, Daily, Pt stated she can take up to 3 times/day if gout acting up       furosemide 40 MG tablet  Commonly known as: LASIX   40 mg, Oral, 2 Times Daily      metFORMIN 500 MG tablet  Commonly known as: GLUCOPHAGE   500 mg, Oral, Daily With Breakfast      metoprolol tartrate 50 MG tablet  Commonly  known as: LOPRESSOR   50 mg, Oral, 2 times daily             Allergies   Allergen Reactions   • Vancomycin Unknown - High Severity   • Sulfa Antibiotics Unknown - Low Severity         Discharge Disposition:      Diet:  Hospital:  Diet Order   Procedures   • Diet Gastrointestinal; Low Residue         Discharge Activity:   Activity Instructions     Driving Restrictions      Type of Restriction: Driving    Driving Restrictions: No Driving Until Next Appointment    Lifting Restrictions      Type of Restriction: Lifting    Lifting Restrictions: Other    Explain Lifing Restrictions: No lifting more than 15 lbs for 6 weeks. Or otherwise specified the day of surgery.    Other Activity Instructions      Activity Instructions: May shower in 24 hours. Do not tub soak incision or swim for at least 2 weeks.            CODE STATUS:    Code Status and Medical Interventions:   Ordered at: 10/18/20 0601     Code Status:    CPR     Medical Interventions (Level of Support Prior to Arrest):    Full         Follow-up Appointments  Future Appointments   Date Time Provider Department Center   11/3/2020  9:20 AM Kenyon Genao MD [facility] None       Additional Instructions for the Follow-ups that You Need to Schedule     Ambulatory Referral to Home Health   As directed      Face to Face Visit Date: 10/19/2020    Follow-up provider for Plan of Care?: I treated the patient in an acute care facility and will not continue treatment after discharge.    Follow-up provider: IKE BARILLAS [[id number]]    Reason/Clinical Findings: s/p ileostomy    Describe mobility limitations that make leaving home difficult: s/p ileostomy    Nursing/Therapeutic Services Requested: Other (eval and treat)    Frequency: 1 Week 1         Call MD With Problems / Concerns   As directed      Call the office, [phone] with any questions or concerns following your surgery.    Order Comments: Call the office, [phone] with any questions or concerns following your  surgery.          Call MD With Problems / Concerns   As directed      Instructions: Call [phone] or email [email or web address] for problems or concerns.    Order Comments: Instructions: Call [phone] or email [email or web address] for problems or concerns.          Discharge Follow-up with PCP   As directed       Currently Documented PCP:    Emmanuel Guzman MD    PCP Phone Number:    [phone]     Follow Up Details: 1-2 weeks         Discharge Follow-up with Specified Provider: Dr. Genao, General Surgery. Call [phone] to schedule; 2 Weeks   As directed      To: Dr. Genao, General Surgery. Call [phone] to schedule    Follow Up: 2 Weeks                 Condition on Discharge:      Stable      This patient has been examined wearing appropriate Personal Protective Equipment. 10/21/20      Electronically signed by Jemma Bowling MD, 10/21/20, 12:17 PM EDT.      Time: I spent  40  minutes on this discharge activity which included face-to-face encounter with the patient/reviewing the data in the system/coordination of the care with the nursing staff as well as consultants/documentation/entering orders.      Electronically signed by Jemma Bowling MD at 10/21/20 7673

## 2020-10-21 NOTE — PROGRESS NOTES
[facility] Medicine Services Daily Progress Note          Hospitalist Team  LOS 2 days      Patient Care Team:  Emmanuel Guzman MD as PCP - General (Family Medicine)  Kenyon Genao MD as Consulting Physician (General Surgery)    Patient Location: [facility]/      Subjective   Subjective     Chief Complaint / Subjective  Small bowel obstruction      Brief Synopsis of Hospital Course/HPI  Ms. Macias is a 70 y.o. female with a history of ulcerative colitis/Crohn's disease, diabetes type 2, GERD, hyperlipidemia, hypertension, extensive abdominal surgeries presented to an outlying facility ([facility] in [facility]) for a sudden onset of acute abdominal pain, nausea, and vomiting.  Patient has an ileostomy and states that she has not had output since about 8 PM on 10/17/2020.  Patient states her pain is a 7 out of 10, intermittent, sharp, cramping, relieved with pain medication to a 3 out of 10.  Patient states she has had a ileostomy since 2012 where she had a bowel resection with a an ileostomy placed.  Patient states she is also had multiple hernia repairs.  Vital signs: 141/61, temp 97.6, heart rate 80, respiratory rate 16, O2 saturation 96%  Patient denies fever, chills, cough, congestion, chest pain, or ill contacts.  Patient was transferred to [facility] for further management and evaluation.     Labs: Lactic acid 1.3, sodium 139, potassium 3.6, BUN 18, creatinine 1.21, glucose 114, ALT 26, AST 38, alkaline phosphatase 127, WBC 8.0, Hgb 15.1, HCT 46.0, platelets 242.     CT scan abdomen pelvis with IV contrast: Mild distal partial small bowel obstruction with transition site seen with enlarged parastomal hernia along the right ileostomy.,  Mild ascites, small cystocele.      Date::   10/19/2020:  Pt did note that she felt flushed and hot overnight, She had a Tmax of 101.1 F per review of vitals. She denies any dysuria, but has had a productive cough with yellow sputum with  "blood tinges, and constant post-nasal drainage, even prior to admission.  She denies any congestion or wheezing. She does not take any antihistamine.    10/20/2020:  Pt states her cough and sinus drainage have improved, she has had  no further fever. Some abdo pain from coughing. NG removed. Tolerating diet.      Review of Systems   Constitution: Positive for fever. Negative for chills, diaphoresis and night sweats.   HENT: Negative for congestion.         Post-nasaldrainage   Cardiovascular: Negative for chest pain, dyspnea on exertion, leg swelling and palpitations.   Respiratory: Positive for cough and sputum production. Negative for hemoptysis, shortness of breath and wheezing.    Skin: Negative.    Musculoskeletal: Negative.    Gastrointestinal: Positive for abdominal pain. Negative for constipation, diarrhea, nausea and vomiting.   Genitourinary: Negative for dysuria, frequency, hematuria and urgency.   Neurological: Negative.    Psychiatric/Behavioral: Negative.          Objective   Objective      Vital Signs  Temp:  [98.5 °F (36.9 °C)-99.7 °F (37.6 °C)] 98.6 °F (37 °C)  Heart Rate:  [] 102  Resp:  [16-20] 16  BP: (157-165)/(70-77) 159/76  Oxygen Therapy  SpO2: 93 %  Pulse Oximetry Type: Intermittent  Device (Oxygen Therapy): room air  Flow (L/min): 2  Flowsheet Rows      First Filed Value   Admission Height  177.8 cm (70\") Documented at 10/18/2020 0606   Admission Weight  75.9 kg (167 lb 5.3 oz) Documented at 10/18/2020 0606        Intake & Output (last 3 days)       10/18 0701 - 10/19 0700 10/19 0701 - 10/20 0700 10/20 0701 - 10/21 0700    P.O.  240 360    I.V. (mL/kg) 2500 (32.8)      Total Intake(mL/kg) 2500 (32.8) 240 (3.1) 360 (4.7)    Urine (mL/kg/hr) 1200 (0.7) 975 (0.5) 300 (0.3)    Emesis/NG output 175 550     Stool 0 0     Total Output 1375 1525 300    Net +1125 -1285 +60               Lines, Drains & Airways    Active LDAs     Name:   Placement date:   Placement time:   Site:   Days:    " Peripheral IV 10/18/20 0634 Anterior;Proximal;Right Forearm   10/18/20    0634    Forearm   1    NG/OG Tube Nasogastric Left nostril   10/18/20    1500 placed in OR    Left nostril   less than 1    Ileostomy Standard (Radha, jennifer) LUQ   10/18/20    1500    LUQ above skin. pink   less than 1    Urethral Catheter   10/18/20    1500 in OR     less than 1                  Physical Exam:  General: well-developed and well-nourished, NAD  HEENT: NC/AT, EOMI, PERRLA, no sinus tenderness to palpation  Heart: RRR. No murmur   Chest: CTAB, no w/r/r, normal respiratory effort  Abdominal: Soft. ND. Bowel sounds present. Ostomy site non-inflammed. Incisions covered.  Musculoskeletal: Normal ROM.  No edema. No calf tenderness.  Neurological: AAOx3, no focal deficits  Skin: Skin is warm and dry. No rash  Psychiatric: Normal mood and affect.       Wounds (last 24 hours)      LDA Wound     Row Name 10/20/20 0731             Wound 10/18/20 1352 midline abdomen Incision    Wound - Properties Group Placement Date: 10/18/20  -EW Placement Time: 1352  -EW Orientation: midline  -EW Location: abdomen  -EW Primary Wound Type: Incision  -EW    Dressing Appearance  intact;dried drainage  -AH      Closure  SHALA  -AH      Base  dressing in place, unable to visualize  -AH      Retired Wound - Properties Group Date first assessed: 10/18/20  -EW Time first assessed: 1352  -EW Location: abdomen  -EW Primary Wound Type: Incision  -EW       Wound 10/18/20 1547 abdomen    Wound - Properties Group Placement Date: 10/18/20  -SC Placement Time: 1547  -SC Location: abdomen  -SC    Dressing Appearance  dry;intact;dried drainage  -AH      Closure  SHALA  -AH      Base  dressing in place, unable to visualize  -AH      Retired Wound - Properties Group Date first assessed: 10/18/20  -SC Time first assessed: 1547 -SC Location: abdomen  -SC      User Key  (r) = Recorded By, (t) = Taken By, (c) = Cosigned By    Initials Name Provider Type    Mohan Kaur RN  Registered Nurse    Ivette Cox, RN Registered Nurse    Rosemarie Saucedo LPN Licensed Nurse          Procedures:  Procedure(s):  LAPAROTOMY EXPLORATOR AND END ILEOSTOMY  VENTRAL/INCISIONAL HERNIA REPAIR  COLON RESECTION SMALL BOWEL    Procedure(s):  LAPAROTOMY EXPLORATOR AND END ILEOSTOMY  VENTRAL/INCISIONAL HERNIA REPAIR  COLON RESECTION SMALL BOWEL  -------------------       Results Review:     I reviewed the patient's new clinical results.    Results from last 7 days   Lab Units 10/20/20  0228 10/19/20  0952 10/18/20  0711   WBC 10*3/mm3 10.20 11.50* 8.70   HEMOGLOBIN g/dL 12.4 13.4 14.2   HEMATOCRIT % 37.4 41.1 42.7   PLATELETS 10*3/mm3 204 211 220     Results from last 7 days   Lab Units 10/20/20  0228 10/19/20  0952 10/18/20  0711   SODIUM mmol/L 140 143 140   POTASSIUM mmol/L 3.9 4.1 4.1   CHLORIDE mmol/L 106 108* 102   CO2 mmol/L 24.0 24.0 26.0   BUN mg/dL 12 11 12   CREATININE mg/dL 0.80 0.73 0.87   GLUCOSE mg/dL 108* 147* 140*   ALBUMIN g/dL 3.00* 3.40* 4.30   BILIRUBIN mg/dL 0.7 0.7 0.5   ALK PHOS U/L 75 89 113   AST (SGOT) U/L 19 33* 26   ALT (SGPT) U/L 22 30 24   CALCIUM mg/dL 8.6 9.2 9.5     Cr Clearance Estimated Creatinine Clearance: 81 mL/min (by C-G formula based on SCr of 0.8 mg/dL).    Coag   Results from last 7 days   Lab Units 10/18/20  0711   INR  1.00   APTT seconds 24.9       HbA1C No results found for: HGBA1C  Blood Glucose   Results from last 7 days   Lab Units 10/20/20  1619 10/20/20  1103 10/20/20  0749 10/19/20  1644 10/19/20  1136 10/19/20  0734   GLUCOSE mg/dL 112* 110* 112* 106* 115* 124*       Troponin     Lipids  No results found for: CHOL, CHLPL, TRIG, HDL, LDL, LDLDIRECT    UA    Results from last 7 days   Lab Units 10/19/20  1707   NITRITE UA  Negative   WBC UA /HPF 0-2*   BACTERIA UA /HPF None Seen   SQUAM EPITHEL UA /HPF 0-2       Microbiology   Results from last 7 days   Lab Units 10/19/20  0952   BLOODCX  No growth at 24 hours  No growth at 24 hours       ABG         EKG  No orders to display       Imaging:  No radiology results for the last 7 days          Xrays, labs reviewed personally by physician.    Medication Review:   I have reviewed the patient's current medication list      Scheduled Meds  azelastine, 2 spray, Each Nare, Daily  enoxaparin, 40 mg, Subcutaneous, Q24H  insulin lispro, 0-9 Units, Subcutaneous, TID AC  piperacillin-tazobactam, 3.375 g, Intravenous, Q8H        Meds Infusions       Meds PRN  •  acetaminophen **OR** acetaminophen **OR** acetaminophen  •  dextrose  •  dextrose  •  glucagon (human recombinant)  •  insulin lispro **AND** insulin lispro  •  labetalol  •  melatonin  •  Morphine  •  ondansetron **OR** ondansetron  •  oxyCODONE  •  promethazine      Assessment/Plan   Assessment/Plan     Active Hospital Problems    Diagnosis  POA   • **Small bowel obstruction (CMS/[facility]) [K56.609]  Yes   • COPD (chronic obstructive pulmonary disease) (CMS/[facility]) [J44.9]  Yes   • Crohn's disease (CMS/[facility]) [K50.90]  Yes   • Parastomal hernia with obstruction and without gangrene [K43.3]  Yes   • Type 2 diabetes mellitus without complication, without long-term current use of insulin (CMS/[facility]) [E11.9]  Yes      Resolved Hospital Problems   No resolved problems to display.       MEDICAL DECISION MAKING COMPLEXITY BY PROBLEM:     Small bowel obstruction  -Parastomal hernia with incarceration and bowel obstruction per surgeon  -10/18/2020 s/p exploratory laparotomy with lysis of adhesions, reduction of incarcerated parastomal hernia, small bowel resection, end ileostomy, ventral hernia repair     - s/p perioperative cefazolin  -NG tube to suction for now, patient remains NPO    Fever -- due to sinus drainage  -T-max 101.1 °F overnight -- likely due to sinus issues from NG tube as opposed to surgery  -Patient coughing up yellow sputum; patient having postnasal drainage     -Noncompliant with IS per nurse  -Check UA/C&S, blood cultures x2 sets, CBC, CMP, sputum culture, lactic  acid  -Start empiric Zosyn to cover broadly for possible intra-abdominal versus other infection source -- can likely discontinue at discharge if all remains normal  -Start Astelin and Ocean spray nasal sprays for postnasal drainage  -Encouraged patient to continue incentive spirometry to prevent pneumonia  -Continue to monitor vitals    DM type II  -No home medication documented  -Accu-Cheks before meals and at bedtime, or every 6 hours while NPO  -SSI coverage as needed    Hypertension  -Currently NPO  -No home medications documented  -Continue to monitor BP and other vitals    COPD  -No acute exacerbation  -Continue to monitor O2 sats and vitals    GERD  -Continue pantoprazole        VTE Prophylaxis  Mechanical Order History:      Ordered        10/18/20 1308  SCD (Sequential Compression Device) - Place on Patient in Pre-Op  Once,   Status:  Canceled         10/18/20 1228  Place Sequential Compression Device on Patient in Pre-Op  Once         10/18/20 0601  Place Sequential Compression Device  Once         10/18/20 0601  Maintain Sequential Compression Device  Continuous                 Pharmalogical Order History:     None          Code Status  Code Status and Medical Interventions:   Ordered at: 10/18/20 0601     Code Status:    CPR     Medical Interventions (Level of Support Prior to Arrest):    Full       This patient has been examined wearing appropriate Personal Protective Equipment. 10/20/20      Discharge Planning    Home in 1-2 days or as per surgeon recommendation      Electronically signed by Jemma Bowling MD, 10/20/20, 20:19 EDT.    [facility] Hospitalist Team

## 2020-10-21 NOTE — PROGRESS NOTES
General Surgery Progress Note     LOS: 3 days   Patient Care Team:  Emmanuel Guzman MD as PCP - General (Family Medicine)  Kenyon Genao MD as Consulting Physician (General Surgery)    Subjective     Interval History:   No nausea or vomiting.  Tolerating diet.  Having ileostomy output.  Pain seems to be reasonably well controlled.    Objective     Vital Signs  Temp:  [98.2 °F (36.8 °C)-98.3 °F (36.8 °C)] 98.3 °F (36.8 °C)  Heart Rate:  [] 83  Resp:  [17-26] 17  BP: (166-177)/(65-77) 166/65    Physical Exam  Constitutional:       Appearance: Normal appearance.   HENT:      Head: Normocephalic and atraumatic.   Cardiovascular:      Comments: Mild tachycardia  Pulmonary:      Effort: Pulmonary effort is normal. No respiratory distress.   Abdominal:      Comments: Soft appropriately tender ileostomy is pink and viable.  Incision healing without any evidence of infection   Skin:     General: Skin is warm and dry.   Neurological:      General: No focal deficit present.      Mental Status: She is alert. Mental status is at baseline.   Psychiatric:         Mood and Affect: Mood normal.         Behavior: Behavior normal.            Results Review:    Lab Results (last 24 hours)     Procedure Component Value Units Date/Time    Blood Culture - Blood, Hand, Left [[id number]] Collected: 10/19/20 0952    Specimen: Blood from Hand, Left Updated: 10/21/20 1015     Blood Culture No growth at 2 days    Blood Culture - Blood, Hand, Right [[id number]] Collected: 10/19/20 0952    Specimen: Blood from Hand, Right Updated: 10/21/20 1015     Blood Culture No growth at 2 days    Respiratory Culture - Sputum, Cough [[id number]] Collected: 10/20/20 0521    Specimen: Sputum from Cough Updated: 10/21/20 0803     Respiratory Culture Moderate growth (3+) Normal Respiratory Carole: NO S.aureus/MRSA or Pseudomonas aeruginosa     Gram Stain Many (4+) WBCs per low power field      Rare (1+) Epithelial cells per low power field      Few (2+)  Mixed bacterial morphotypes seen on Gram Stain    POC Glucose Once [[id number]]  (Normal) Collected: 10/21/20 0738    Specimen: Blood Updated: 10/21/20 0740     Glucose 101 mg/dL      Comment: Serial Number: [id number]:  [id number]       Comprehensive Metabolic Panel [[id number]]  (Abnormal) Collected: 10/21/20 0532    Specimen: Blood Updated: 10/21/20 0633     Glucose 116 mg/dL      BUN 15 mg/dL      Creatinine 0.73 mg/dL      Sodium 140 mmol/L      Potassium 3.7 mmol/L      Chloride 105 mmol/L      CO2 26.0 mmol/L      Calcium 8.6 mg/dL      Total Protein 5.7 g/dL      Albumin 3.00 g/dL      ALT (SGPT) 16 U/L      AST (SGOT) 17 U/L      Alkaline Phosphatase 87 U/L      Total Bilirubin 0.6 mg/dL      eGFR Non African Amer 79 mL/min/1.73      Globulin 2.7 gm/dL      A/G Ratio 1.1 g/dL      BUN/Creatinine Ratio 20.5     Anion Gap 9.0 mmol/L     Narrative:      GFR Normal >60  Chronic Kidney Disease <60  Kidney Failure <15      CBC & Differential [[id number]]  (Abnormal) Collected: 10/21/20 0532    Specimen: Blood Updated: 10/21/20 0610    Narrative:      The following orders were created for panel order CBC & Differential.  Procedure                               Abnormality         Status                     ---------                               -----------         ------                     CBC Auto Differential[[id number]]        Abnormal            Final result                 Please view results for these tests on the individual orders.    CBC Auto Differential [[id number]]  (Abnormal) Collected: 10/21/20 0532    Specimen: Blood Updated: 10/21/20 0610     WBC 8.30 10*3/mm3      RBC 3.97 10*6/mm3      Hemoglobin 11.9 g/dL      Hematocrit 36.2 %      MCV 91.1 fL      MCH 30.0 pg      MCHC 33.0 g/dL      RDW 14.2 %      RDW-SD 45.5 fl      MPV 8.9 fL      Platelets 233 10*3/mm3      Neutrophil % 58.5 %      Lymphocyte % 19.4 %      Monocyte % 11.2 %      Eosinophil % 10.1 %      Basophil % 0.8 %       "Neutrophils, Absolute 4.80 10*3/mm3      Lymphocytes, Absolute 1.60 10*3/mm3      Monocytes, Absolute 0.90 10*3/mm3      Eosinophils, Absolute 0.80 10*3/mm3      Basophils, Absolute 0.10 10*3/mm3      nRBC 0.0 /100 WBC     POC Glucose Once [[id number]]  (Normal) Collected: 10/20/20 2131    Specimen: Blood Updated: 10/20/20 2133     Glucose 98 mg/dL      Comment: Serial Number: [id number]:  [id number]       POC Glucose Once [[id number]]  (Abnormal) Collected: 10/20/20 1619    Specimen: Blood Updated: 10/20/20 1620     Glucose 112 mg/dL      Comment: Serial Number: [id number]:  [id number]       Tissue Pathology Exam [[id number]] Collected: 10/18/20 1507    Specimen: Tissue from Small Intestine Updated: 10/20/20 1539     Case Report --     Surgical Pathology Report                         Case: [id number]                                  Authorizing Provider:  Kenyon Genao MD        Collected:           10/18/2020 03:07 PM          Ordering Location:     [facility] MAIN  Received:            10/19/2020 09:04 AM                                 OR                                                                           Pathologist:           Leonardo Pagan MD                                                           Specimen:    Small Intestine, SMALL BOWEL                                                                Final Diagnosis --     Small bowel with ostomy site, takedown excision:    Benign small intestine with reactive skin and ostomy site exhibiting chronic inflammation    Negative for dysplasia or malignancy     CLAIRE/tkd        Gross Description --     Received in a single container of formalin labeled \"Small bowel\" is a 11 cm length of small intestine with an average diameter of about 2.5 cm. There is an abundant amount of attached yellow-tan mesenteric fatty tissue. Both ends are closed with a line of surgical staples. Near one end there is what appears to be an ostomy with a " possible thin rim of pink-tan skin. A representative section at this ostomy site is submitted in A. The remainder of the specimen is opened revealing unremarkable pink-tan mucosal folds of the small intestine. No mass lesions or ulcers are noted. Representative sections are submitted in B.     CLAIRE/sms            Imaging Results (Last 24 Hours)     ** No results found for the last 24 hours. **         I reviewed the patient's new clinical results.    Medication Review:    Current Facility-Administered Medications:   •  acetaminophen (TYLENOL) tablet 650 mg, 650 mg, Oral, Q4H PRN **OR** acetaminophen (TYLENOL) 160 MG/5ML solution 650 mg, 650 mg, Oral, Q4H PRN **OR** acetaminophen (TYLENOL) suppository 650 mg, 650 mg, Rectal, Q4H PRN, Jemma Bowling MD  •  azelastine (ASTELIN) nasal spray 2 spray, 2 spray, Each Nare, Daily, Jemma Bowling MD, 2 spray at 10/21/20 0859  •  colchicine tablet 0.6 mg, 0.6 mg, Oral, Daily, Jemma Bowling MD  •  dextrose (D50W) 25 g/ 50mL Intravenous Solution 25 g, 25 g, Intravenous, Q15 Min PRN, Jemma Bowling MD  •  dextrose (GLUTOSE) oral gel 15 g, 15 g, Oral, Q15 Min PRN, Jemma Bowling MD  •  enoxaparin (LOVENOX) syringe 40 mg, 40 mg, Subcutaneous, Q24H, Jemma Bowling MD, 40 mg at 10/20/20 1710  •  furosemide (LASIX) tablet 40 mg, 40 mg, Oral, BID, Jemma Bowlnig MD  •  glucagon (human recombinant) (GLUCAGEN DIAGNOSTIC) injection 1 mg, 1 mg, Subcutaneous, Q15 Min PRN, Jemma Bowling MD  •  insulin lispro (ADMELOG) injection 0-9 Units, 0-9 Units, Subcutaneous, TID AC **AND** insulin lispro (ADMELOG) injection 0-9 Units, 0-9 Units, Subcutaneous, PRN, Jemma Bowling MD  •  labetalol (NORMODYNE,TRANDATE) injection 20 mg, 20 mg, Intravenous, Q4H PRN, Jemma Bowling MD, 20 mg at 10/19/20 1800  •  melatonin tablet 5 mg, 5 mg, Oral, Nightly PRN, Jemma Bowling MD  •  Morphine sulfate (PF) injection 2 mg, 2 mg, Intravenous, Q4H PRN, Tawnya,  Jemma SIFUENTES MD, 2 mg at 10/21/20 0320  •  ondansetron (ZOFRAN) tablet 4 mg, 4 mg, Oral, Q6H PRN, 4 mg at 10/21/20 0329 **OR** ondansetron (ZOFRAN) injection 4 mg, 4 mg, Intravenous, Q6H PRN, Jemma Bowling MD, 4 mg at 10/20/20 2015  •  oxyCODONE (ROXICODONE) immediate release tablet 5 mg, 5 mg, Oral, Q4H PRN, Jemma Bowling MD, 5 mg at 10/21/20 0905  •  promethazine (PHENERGAN) IVPB 12.5 mg, 12.5 mg, Intravenous, Q6H PRN, Jemma Bowling MD, 12.5 mg at 10/20/20 0458    Assessment/Plan     Principal Problem:    Small bowel obstruction (CMS/HCC)  Active Problems:    Type 2 diabetes mellitus without complication, without long-term current use of insulin (CMS/HCC)    COPD (chronic obstructive pulmonary disease) (CMS/HCC)    Crohn's disease (CMS/HCC)    Parastomal hernia with obstruction and without gangrene      Postop day 3 exploratory laparotomy reduction of parastomal hernia ventral hernia repair and ileostomy.    Tolerating low residue diet  Pain medication  Ambulatory  Stoma functioning  White blood cell count normal, okay for my standpoint to stop her antibiotics    Okay for discharge home from my standpoint today.  Okay to shower  No lifting more than 10 to 15 pounds for 6 weeks  Follow-up in 2 weeks for staple removal        This note was created using Dragon Voice Recognition software.    Kenyon Genao MD  10/21/20  11:07 EDT

## 2020-10-21 NOTE — PROGRESS NOTES
Case Management Discharge Note      Final Note: Home with [facility]    Provided Post Acute Provider List?: Refused  Refused Provider List Comment: has used [facility] in the past    Selected Continued Care - Discharged on 10/21/2020 Admission date: 10/18/2020 - Discharge disposition: Home or Self Care        Home Medical Care     Service Provider Selected Services Address Phone Fax    [facility] [address] [place] [address] [phone]       Internal Comment last updated by Sylvia Valero, RN 10/20/2020 1222    Need to fax d/c summary                                Final Discharge Disposition Code: 06 - home with home health care

## 2020-10-21 NOTE — PROGRESS NOTES
Continued Stay Note  RAMON Wan     Patient Name: Leonor Macias  MRN: [id number]  Today's Date: 10/21/2020    Admit Date: 10/18/2020    Discharge Plan     Row Name 10/21/20 1151       Plan    Plan  D/C Plan: Home with [facility] (accepted, need to fax d/c summary).    Plan Comments  Barrier to D/C: IV abx, advancing diet.          Expected Discharge Date and Time     Expected Discharge Date Expected Discharge Time    Oct 21, 2020             Cristin Moore

## 2020-10-21 NOTE — PROGRESS NOTES
Discharge Planning Assessment   [facility]     Patient Name: Leonor Macias  MRN: [id number]  Today's Date: 10/21/2020    Admit Date: 10/18/2020      Discharge Plan     Row Name 10/21/20 0854       Plan    Plan  D/C Plan: Home with [facility] (accepted, d/c summary faxed).    Plan Comments   faxed d/c summary to [facility].  spoke to patient in room wearing mask and goggles and keeping distance greater than 6 feet and spent less than 15 minutes in room. [facility] letter reviewed with patient, verbal consent obtained and copy left at bedside.    Row Name 10/21/20 1993       Plan    Plan  D/C Plan: Home with [facility] (accepted, need to fax d/c summary).    Plan Comments  Barrier to D/C: IV abx, advancing diet.          Patient Forms     Row Name 10/21/20 7511       Patient Forms    Important Message from Medicare ([facility])  Delivered 10/21/2020    Delivered to  Patient    Method of delivery  In person            Cristin Moore

## 2020-10-21 NOTE — PLAN OF CARE
Goal Outcome Evaluation:  Plan of Care Reviewed With: patient  Progress: improving  Outcome Summary: pt doing well. no complaints at this time. resting well. Pt is having stool from her ostomy. Pt unable to tolerate her dinner, stated the smell of the soup made her nausous. pain controlled witth PRN pain medication. will continue to monitor.

## 2020-10-22 ENCOUNTER — READMISSION MANAGEMENT (OUTPATIENT)
Dept: CALL CENTER | Facility: HOSPITAL | Age: 70
End: 2020-10-22

## 2020-10-22 LAB
BACTERIA SPEC RESP CULT: NORMAL
GRAM STN SPEC: NORMAL

## 2020-10-24 LAB
BACTERIA SPEC AEROBE CULT: NORMAL
BACTERIA SPEC AEROBE CULT: NORMAL

## 2020-10-26 ENCOUNTER — READMISSION MANAGEMENT (OUTPATIENT)
Dept: CALL CENTER | Facility: HOSPITAL | Age: 70
End: 2020-10-26

## 2020-10-26 NOTE — OUTREACH NOTE
General Surgery Week 1 Survey      Responses   [facility], [place], operated by [facility] patient discharged from?  [facility]   Does the patient have one of the following disease processes/diagnoses(primary or secondary)?  General Surgery   Week 1 attempt successful?  Yes   Call start time  1440   Call end time  1445   Discharge diagnosis  Small bowel obstruction    Meds reviewed with patient/caregiver?  Yes   Is the patient having any side effects they believe may be caused by any medication additions or changes?  No   Does the patient have all medications related to this admission filled (includes all antibiotics, pain medications, etc.)  Yes   Is the patient taking all medications as directed (includes completed medication regime)?  Yes   Does the patient have a follow up appointment scheduled with their surgeon?  Yes   Has the patient kept scheduled appointments due by today?  N/A   What is the Home health agency?   [facility]   Has home health visited the patient within 72 hours of discharge?  Yes   Psychosocial issues?  No   Did the patient receive a copy of their discharge instructions?  Yes   Nursing interventions  Reviewed instructions with patient   What is the patient's perception of their health status since discharge?  Improving   Nursing interventions  Nurse provided patient education   Is the patient /caregiver able to teach back basic post-op care?  Continue use of incentive spirometry at least 1 week post discharge, Practice 'cough and deep breath', Drive as instructed by MD in discharge instructions, Take showers only when approved by MD-sponge bathe until then, No tub bath, swimming, or hot tub until instructed by MD, Keep incision areas clean,dry and protected, Do not remove steri-strips, Lifting as instructed by MD in discharge instructions   Is the patient/caregiver able to teach back signs and symptoms of incisional infection?  Increased redness, swelling or pain at the incisonal site, Increased drainage  or bleeding, Incisional warmth, Pus or odor from incision, Fever   Is the patient/caregiver able to teach back steps to recovery at home?  Set small, achievable goals for return to baseline health, Rest and rebuild strength, gradually increase activity   Is the patient/caregiver able to teach back the hierarchy of who to call/visit for symptoms/problems? PCP, Specialist, Home health nurse, Urgent Care, ED, 911  Yes   Additional teach back comments  ostomy intact,   Week 1 call completed?  Yes          Marva Randall RN

## 2020-11-03 ENCOUNTER — READMISSION MANAGEMENT (OUTPATIENT)
Dept: CALL CENTER | Facility: HOSPITAL | Age: 70
End: 2020-11-03

## 2020-11-03 ENCOUNTER — OFFICE VISIT (OUTPATIENT)
Dept: SURGERY | Facility: CLINIC | Age: 70
End: 2020-11-03

## 2020-11-03 VITALS
DIASTOLIC BLOOD PRESSURE: 82 MMHG | WEIGHT: 158.2 LBS | OXYGEN SATURATION: 97 % | RESPIRATION RATE: 18 BRPM | BODY MASS INDEX: 22.65 KG/M2 | HEART RATE: 72 BPM | SYSTOLIC BLOOD PRESSURE: 126 MMHG | HEIGHT: 70 IN | TEMPERATURE: 97.1 F

## 2020-11-03 DIAGNOSIS — K43.3 PARASTOMAL HERNIA WITH OBSTRUCTION AND WITHOUT GANGRENE: Primary | ICD-10-CM

## 2020-11-03 PROCEDURE — 99024 POSTOP FOLLOW-UP VISIT: CPT | Performed by: SURGERY

## 2020-11-03 RX ORDER — ATORVASTATIN CALCIUM 10 MG/1
10 TABLET, FILM COATED ORAL NIGHTLY
COMMUNITY
End: 2021-04-15 | Stop reason: ALTCHOICE

## 2020-11-03 RX ORDER — OXYCODONE AND ACETAMINOPHEN 10; 325 MG/1; MG/1
1 TABLET ORAL AS NEEDED
COMMUNITY

## 2020-11-03 RX ORDER — PREDNISONE 10 MG/1
1 TABLET ORAL DAILY
COMMUNITY
End: 2021-04-15 | Stop reason: ALTCHOICE

## 2020-11-03 NOTE — PROGRESS NOTES
"Subjective   Leonor Macias is a 70 y.o. female.   70-year-old lady who is a couple weeks out from exploratory laparotomy with reduction of parastomal hernia closure of the abdominal wall and maturation of a left-sided ileostomy.  She has done tremendously well.  She has no significant pain she is tolerating diet having regular stoma output no issues with pouching.    Objective   /82 (BP Location: Right arm, Patient Position: Sitting, Cuff Size: Adult)   Pulse 72   Temp 97.1 °F (36.2 °C) (Temporal)   Resp 18   Ht 177.8 cm (70\")   Wt 71.8 kg (158 lb 3.2 oz)   SpO2 97%   BMI 22.70 kg/m²   Physical Exam  On exam she is in no distress her abdomen is soft it is obese but none distended or tender to palpation the incisions have healed well with some excoriation due to the staples being present but no erythema or drainage.  The ileostomy is pink and viable and productive of green effluent    Assessment/Plan   Diagnoses and all orders for this visit:    1. Parastomal hernia with obstruction and without gangrene (Primary)    About 2 weeks out from exploratory laparotomy with reduction of parastomal hernia.  We will have her follow-up in about 1 month.  Continue lifting restrictions no greater than 10 to 15 pounds for another month.    Kenyon Genao MD  11/3/2020  9:37 AM EST    This note was created using Dragon Voice Recognition software.  "

## 2020-11-03 NOTE — OUTREACH NOTE
General Surgery Week 2 Survey      Responses   [facility] patient discharged from?  [facility]   Does the patient have one of the following disease processes/diagnoses(primary or secondary)?  General Surgery   Week 2 attempt successful?  Yes   Call start time  1629   Call end time  1635   Discharge diagnosis  Small bowel obstruction,  lysis of adhesions, reduction of incarcerated parastomal hernia, small bowel resection, and ileostomy, and ventral hernia repair    Is patient permission given to speak with other caregiver?  No   Meds reviewed with patient/caregiver?  Yes   Is the patient taking all medications as directed (includes completed medication regime)?  Yes   Does the patient have a follow up appointment scheduled with their surgeon?  Yes   Has the patient kept scheduled appointments due by today?  Yes   What is the Home health agency?   Discharged by .    Psychosocial issues?  No   Did the patient receive a copy of their discharge instructions?  Yes   What is the patient's perception of their health status since discharge?  Improving   Nursing interventions  Nurse provided patient education   Is the patient/caregiver able to teach back signs and symptoms of incisional infection?  Increased redness, swelling or pain at the incisonal site, Increased drainage or bleeding, Incisional warmth, Pus or odor from incision, Fever [Denies any signs of infection. ]   Is the patient/caregiver able to teach back steps to recovery at home?  Set small, achievable goals for return to baseline health, Rest and rebuild strength, gradually increase activity   If the patient is a current smoker, are they able to teach back resources for cessation?  Not a smoker   Is the patient/caregiver able to teach back the hierarchy of who to call/visit for symptoms/problems? PCP, Specialist, Home health nurse, Urgent Care, ED, 911  Yes   Week 2 call completed?  Yes   Revoked  No further contact(revokes)-requires comment    Graduated/Revoked comments  Patient reports that she is doing well. Reports keeping up with all needed follow up,  dc'd by . States that she has had ostomy beforer and knows what to watch for. Denies any new questions or concerns today. No further calls.           Shruthi Pacheco RN

## 2021-01-28 ENCOUNTER — OFFICE VISIT (OUTPATIENT)
Dept: SURGERY | Facility: CLINIC | Age: 71
End: 2021-01-28

## 2021-01-28 DIAGNOSIS — K43.3 PARASTOMAL HERNIA WITH OBSTRUCTION AND WITHOUT GANGRENE: Primary | ICD-10-CM

## 2021-01-28 PROCEDURE — 99024 POSTOP FOLLOW-UP VISIT: CPT | Performed by: SURGERY

## 2021-01-29 NOTE — PROGRESS NOTES
Subjective   Leonor Macias is a 70 y.o. female.   70-year lady who is now 3 months out from exploratory laparotomy reduction of parastomal hernia hernia repair and relocation of her end ileostomy for an incarcerated parastomal hernia causing bowel obstruction.  We are talking today on the phone for a second postop telephone visit.  She says that she has been doing wonderfully.  She has no incisional issues like pain or bulging.  She is tolerating a diet she is having regular ileostomy output without any trouble pouching.  He has no questions for me today.    Objective   There were no vitals taken for this visit.    Assessment/Plan   Diagnoses and all orders for this visit:    1. Parastomal hernia with obstruction and without gangrene (Primary)    3 months out from exploratory laparotomy and parastomal hernia repair with relocation of the ileostomy.  Doing well.  Will have follow-up with her regular providers.  Can follow-up with me as needed in the future if any problems occur.  No restrictions at this point.    Kenyon Genao MD  1/29/2021  10:05 AM EST    This note was created using Dragon Voice Recognition software.

## 2021-04-15 ENCOUNTER — OFFICE VISIT (OUTPATIENT)
Dept: CARDIOLOGY | Facility: CLINIC | Age: 71
End: 2021-04-15

## 2021-04-15 VITALS
OXYGEN SATURATION: 97 % | HEIGHT: 63 IN | DIASTOLIC BLOOD PRESSURE: 62 MMHG | BODY MASS INDEX: 28 KG/M2 | WEIGHT: 158 LBS | SYSTOLIC BLOOD PRESSURE: 140 MMHG | HEART RATE: 54 BPM

## 2021-04-15 DIAGNOSIS — R07.9 CHEST PAIN, UNSPECIFIED TYPE: ICD-10-CM

## 2021-04-15 DIAGNOSIS — E78.2 MIXED HYPERLIPIDEMIA: ICD-10-CM

## 2021-04-15 DIAGNOSIS — I10 ESSENTIAL HYPERTENSION: ICD-10-CM

## 2021-04-15 DIAGNOSIS — R94.31 ABNORMAL EKG: ICD-10-CM

## 2021-04-15 DIAGNOSIS — E11.9 TYPE 2 DIABETES MELLITUS WITHOUT COMPLICATION, WITHOUT LONG-TERM CURRENT USE OF INSULIN (HCC): Primary | Chronic | ICD-10-CM

## 2021-04-15 PROCEDURE — 99214 OFFICE O/P EST MOD 30 MIN: CPT | Performed by: INTERNAL MEDICINE

## 2021-04-15 NOTE — PROGRESS NOTES
"    Subjective:     Encounter Date:04/15/2021      Patient ID: Leonor Macias is a 71 y.o. female.    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  She is also scheduled for left total knee replacement  Advised echocardiogram to rule out any underlying structural heart disease and stress Myoview probably with IV Lexiscan due to the knee problems      The following portions of the patient's history were reviewed and updated as appropriate: Allergies current medications past family history past medical history past social history past surgical history problem list and review of systems    /62 (BP Location: Left arm, Patient Position: Sitting, Cuff Size: Adult)   Pulse 54   Ht 158.8 cm (62.5\")   Wt 71.7 kg (158 lb)   LMP  (LMP Unknown)   SpO2 97%   BMI 28.44 kg/m²     Past Medical History:   Diagnosis Date   • Crohn's disease (CMS/HCC)    • Diabetes mellitus (CMS/HCC)    • GERD (gastroesophageal reflux disease)    • Hyperlipidemia    • Hypertension      Past Surgical History:   Procedure Laterality Date   • CHOLECYSTECTOMY     • COLON RESECTION SMALL BOWEL N/A 10/18/2020    Procedure: COLON RESECTION SMALL BOWEL;  Surgeon: Kenyon Genao MD;  Location: [facility];  Service: General;  Laterality: N/A;   • COLON RESECTION WITH ILEOSTOMY  2012   • EXPLORATORY LAPAROTOMY N/A 10/18/2020    Procedure: LAPAROTOMY EXPLORATOR AND END ILEOSTOMY;  Surgeon: Kenyon Genao MD;  Location: [facility] OR;  Service: General;  Laterality: N/A;   • HERNIA REPAIR     • TONSILLECTOMY     • TUBAL ABDOMINAL LIGATION     • " VENTRAL/INCISIONAL HERNIA REPAIR N/A 10/18/2020    Procedure: VENTRAL/INCISIONAL HERNIA REPAIR;  Surgeon: Kenyon Genao MD;  Location: [facility] MAIN OR;  Service: General;  Laterality: N/A;     Social History     Socioeconomic History   • Marital status:      Spouse name: Not on file   • Number of children: Not on file   • Years of education: Not on file   • Highest education level: Not on file   Tobacco Use   • Smoking status: Former Smoker   • Smokeless tobacco: Never Used   Vaping Use   • Vaping Use: Never used   Substance and Sexual Activity   • Alcohol use: Not Currently   • Drug use: Never   • Sexual activity: Defer     Family History   Problem Relation Age of Onset   • Cancer Mother    • Cancer Father    • Diabetes Father    • Cancer Brother        Current Outpatient Medications:   •  colchicine 0.6 MG tablet, Take 0.6 mg by mouth Daily. Pt stated she can take up to 3 times/day if gout acting up, Disp: , Rfl:   •  furosemide (LASIX) 40 MG tablet, Take 40 mg by mouth 2 (Two) Times a Day., Disp: , Rfl:   •  metFORMIN (GLUCOPHAGE) 500 MG tablet, Take 500 mg by mouth Daily With Breakfast., Disp: , Rfl:   •  metoprolol tartrate (LOPRESSOR) 50 MG tablet, Take 50 mg by mouth 2 (two) times a day., Disp: , Rfl:   •  oxyCODONE-acetaminophen (PERCOCET)  MG per tablet, Take 1 tablet by mouth As Needed., Disp: , Rfl:   Allergies   Allergen Reactions   • Vancomycin Unknown - High Severity   • Sulfa Antibiotics Unknown - Low Severity       Review of Systems   Constitutional: Negative for fever and malaise/fatigue.   HENT: Negative for congestion and hearing loss.    Eyes: Negative for double vision and visual disturbance.   Cardiovascular: Positive for chest pain and leg swelling. Negative for claudication, dyspnea on exertion and syncope.   Respiratory: Negative for cough and shortness of breath.    Endocrine: Negative for cold intolerance.   Skin: Negative for color change and rash.   Musculoskeletal:  Negative for arthritis and joint pain.   Gastrointestinal: Negative for abdominal pain and heartburn.   Genitourinary: Negative for hematuria.   Neurological: Negative for excessive daytime sleepiness and dizziness.   Psychiatric/Behavioral: Negative for depression. The patient is not nervous/anxious.    All other systems reviewed and are negative.             Objective:     Constitutional:       Appearance: Well-developed.   Eyes:      General: No scleral icterus.     Conjunctiva/sclera: Conjunctivae normal.   HENT:      Head: Normocephalic and atraumatic.    Mouth/Throat:      Mouth: No oral lesions.      Pharynx: Uvula midline.   Neck:      Thyroid: No thyromegaly.      Vascular: No carotid bruit or JVD.      Trachea: Trachea normal.   Pulmonary:      Effort: Pulmonary effort is normal.      Breath sounds: Normal breath sounds.   Cardiovascular:      Normal rate. Regular rhythm.      No gallop.   Pulses:     Intact distal pulses.   Abdominal:      General: Bowel sounds are normal.      Palpations: Abdomen is soft.   Musculoskeletal: Normal range of motion.      Cervical back: Neck supple. Skin:     General: Skin is warm. There is no cyanosis.   Neurological:      Mental Status: Alert and oriented to person, place, and time.      Comments: No focal deficits   Psychiatric:         Behavior: Behavior is cooperative.         Procedures    Lab Review:       Assessment:          Diagnosis Plan   1. Type 2 diabetes mellitus without complication, without long-term current use of insulin (CMS/[facility])     2. Mixed hyperlipidemia     3. Essential hypertension     4. Chest pain, unspecified type     5. Abnormal EKG            Plan:                  MDM  Number of Diagnoses or Management Options  Abnormal EKG: new, needed workup  Chest pain, unspecified type: new, needed workup  Essential hypertension: established, improving  Mixed hyperlipidemia: established, improving  Type 2 diabetes mellitus without complication, without  long-term current use of insulin (CMS/[facility]): established, improving     Amount and/or Complexity of Data Reviewed  Clinical lab tests: ordered and reviewed  Review and summarize past medical records: yes

## 2021-04-22 ENCOUNTER — OUTSIDE FACILITY SERVICE (OUTPATIENT)
Dept: CARDIOLOGY | Facility: CLINIC | Age: 71
End: 2021-04-22

## 2021-04-22 PROCEDURE — 93016 CV STRESS TEST SUPVJ ONLY: CPT | Performed by: INTERNAL MEDICINE

## 2021-04-22 PROCEDURE — 93306 TTE W/DOPPLER COMPLETE: CPT | Performed by: INTERNAL MEDICINE

## 2021-04-22 PROCEDURE — 78452 HT MUSCLE IMAGE SPECT MULT: CPT | Performed by: INTERNAL MEDICINE

## 2021-04-22 PROCEDURE — 93018 CV STRESS TEST I&R ONLY: CPT | Performed by: INTERNAL MEDICINE

## 2021-04-26 ENCOUNTER — TELEPHONE (OUTPATIENT)
Dept: CARDIOLOGY | Facility: CLINIC | Age: 71
End: 2021-04-26

## (undated) DEVICE — TP SXN YANKR BULB STRL

## (undated) DEVICE — SUT SILK 2/0 30IN A305H

## (undated) DEVICE — SUT VIC 3/0 SH CR8 18IN J864D

## (undated) DEVICE — PK MAJ LAPAROTOMY 50

## (undated) DEVICE — DRSNG WND GZ PAD BORDERED 4X8IN STRL

## (undated) DEVICE — DRSNG WND BORDR/ADHS NONADHR/GZ LF 4X4IN STRL

## (undated) DEVICE — TUBING, SUCTION, 1/4" X 12', STRAIGHT: Brand: MEDLINE

## (undated) DEVICE — 2-PIECE OSTOMY SKIN BARRIER, FORMAFLEX: Brand: NEW IMAGE

## (undated) DEVICE — PENCL HND ROCKRSWTCH HOLSTR EZ CLEAN TP CRD 10FT

## (undated) DEVICE — GLV SURG BIOGEL LTX PF 7

## (undated) DEVICE — SUT PDS 1 TP1 48IN Z880G BX/12

## (undated) DEVICE — COVER,MAYO STAND,STERILE: Brand: MEDLINE

## (undated) DEVICE — SUT SILK 2/0 SH 30IN K833H

## (undated) DEVICE — KT SURG TURNOVER 050

## (undated) DEVICE — 450 ML BOTTLE OF 0.05% CHLORHEXIDINE GLUCONATE IN 99.95% STERILE WATER FOR IRRIGATION, USP AND APPLICATOR.: Brand: IRRISEPT ANTIMICROBIAL WOUND LAVAGE

## (undated) DEVICE — SOL IRRIG H2O 1000ML STRL

## (undated) DEVICE — UNDERGLV SURG BIOGEL INDICATOR LTX PF 7

## (undated) DEVICE — DECANTER: Brand: UNBRANDED

## (undated) DEVICE — SUT PROLN 0 CT1 18IN C821G

## (undated) DEVICE — SUT SILK 3/0 SH CR8 30IN C017D

## (undated) DEVICE — SOL IRRIG NACL 9PCT 1000ML BTL

## (undated) DEVICE — DRSNG TELFA PAD NONADH STR 1S 3X8IN

## (undated) DEVICE — PAD,ABDOMINAL,5"X9",STERILE,LF,1/PK: Brand: MEDLINE INDUSTRIES, INC.

## (undated) DEVICE — ELECTRD BLD EZ CLN STD 6.5IN

## (undated) DEVICE — 2-PIECE DRAINABLE OSTOMY POUCH: Brand: NEW IMAGE

## (undated) DEVICE — SUT PDS2 CTX 60IN

## (undated) DEVICE — TOWEL,OR,DSP,ST,WHITE,DLX,4/PK,20PK/CS: Brand: MEDLINE